# Patient Record
Sex: FEMALE | Race: ASIAN | Employment: OTHER | URBAN - METROPOLITAN AREA
[De-identification: names, ages, dates, MRNs, and addresses within clinical notes are randomized per-mention and may not be internally consistent; named-entity substitution may affect disease eponyms.]

---

## 2017-03-20 DIAGNOSIS — Z00.00 ENCOUNTER FOR ROUTINE ADULT HEALTH EXAMINATION WITHOUT ABNORMAL FINDINGS: ICD-10-CM

## 2017-03-20 PROCEDURE — 86480 TB TEST CELL IMMUN MEASURE: CPT | Performed by: INTERNAL MEDICINE

## 2017-03-20 PROCEDURE — 36415 COLL VENOUS BLD VENIPUNCTURE: CPT | Performed by: INTERNAL MEDICINE

## 2017-03-21 LAB
M TB TUBERC IFN-G BLD QL: ABNORMAL
M TB TUBERC IFN-G/MITOGEN IGNF BLD: 0.85 IU/ML

## 2017-03-23 ENCOUNTER — TELEPHONE (OUTPATIENT)
Dept: INTERNAL MEDICINE | Facility: CLINIC | Age: 47
End: 2017-03-23

## 2017-03-23 DIAGNOSIS — R76.12 POSITIVE QUANTIFERON-TB GOLD TEST: Primary | ICD-10-CM

## 2017-03-23 NOTE — TELEPHONE ENCOUNTER
Reason for Call:  Request for results:    Name of test or procedure: blood tests   Date of test of procedure: 11/2    Location of the test or procedure: [facility] to leave the result message on voice mail or with a family member? YES    Phone number Patient can be reached at:  Home number on file [phone] (home)    Additional comments: lab results from px    Call taken on 3/23/2017 at 8:24 AM by Ashley Yousif

## 2017-03-23 NOTE — TELEPHONE ENCOUNTER
See her results. She is low positive again. She is calling again and sent my chart message.     Please advise.

## 2017-03-24 ENCOUNTER — ALLIED HEALTH/NURSE VISIT (OUTPATIENT)
Dept: NURSING | Facility: CLINIC | Age: 47
End: 2017-03-24
Payer: COMMERCIAL

## 2017-03-24 DIAGNOSIS — Z11.1 SCREENING EXAMINATION FOR PULMONARY TUBERCULOSIS: Primary | ICD-10-CM

## 2017-03-24 PROCEDURE — 86580 TB INTRADERMAL TEST: CPT

## 2017-03-24 NOTE — TELEPHONE ENCOUNTER
Call to ID for earlier appt.     Appt at 4/4 at 10:15. Dr Rios. They are mailing her information.     Call to pt and advised. She agrees.     Faxed ID clinic results.

## 2017-03-24 NOTE — TELEPHONE ENCOUNTER
Test is low positive both times, pl advise to consult with Infectious disease for further recommendations, I have put the referral

## 2017-03-24 NOTE — TELEPHONE ENCOUNTER
[facility] [employer] - Kim [phone]       Pt informed of PCP's message below.  Referral info provided to her.

## 2017-03-24 NOTE — TELEPHONE ENCOUNTER
Pt calls again. Advised her that she needs to allow more time for Dr. Silvestre to review and provide recommendations for her.

## 2017-03-24 NOTE — TELEPHONE ENCOUNTER
Pt called-stated she called [facility] for appt, but they cant get her in til end of April. Pt stated she has to get back to work. Was told doctor or nurse from our clinic needs to call so pt can get in sooner. Can reach pt at [phone]

## 2017-03-24 NOTE — TELEPHONE ENCOUNTER
Pt is calling in again, looking for the results of her TB test.  She needs these results to return to work.  She states that she had the same results last time.    Please review lab and advise  Howard GLASER RN

## 2017-03-27 ENCOUNTER — RADIANT APPOINTMENT (OUTPATIENT)
Dept: GENERAL RADIOLOGY | Facility: CLINIC | Age: 47
End: 2017-03-27
Attending: INTERNAL MEDICINE
Payer: COMMERCIAL

## 2017-03-27 ENCOUNTER — ALLIED HEALTH/NURSE VISIT (OUTPATIENT)
Dept: NURSING | Facility: CLINIC | Age: 47
End: 2017-03-27
Payer: COMMERCIAL

## 2017-03-27 DIAGNOSIS — R76.12 POSITIVE QUANTIFERON-TB GOLD TEST: ICD-10-CM

## 2017-03-27 DIAGNOSIS — Z11.1 VISIT FOR MANTOUX TEST: Primary | ICD-10-CM

## 2017-03-27 LAB
PPDINDURATION: 0 MM (ref 0–5)
PPDREDNESS: 0 MM

## 2017-03-27 PROCEDURE — 99207 ZZC NO CHARGE NURSE ONLY: CPT

## 2017-03-27 PROCEDURE — 71020 XR CHEST 2 VW: CPT

## 2017-03-27 NOTE — NURSING NOTE
Mantoux result:  Lab Results   Component Value Date    PPDREDNESS 0 03/04/2016    PPDINDURATIO 0 03/04/2016     Angelica Lambert MA

## 2017-03-27 NOTE — MR AVS SNAPSHOT
After Visit Summary   3/27/2017    Alida Pérez    MRN: [id number]           Patient Information     Date Of Birth          1970        Visit Information        Provider Department      3/27/2017 8:30 AM DOROTHY COPELAND/LPN [facility]        Today's Diagnoses     Visit for Mantoux test    -  1       Follow-ups after your visit        Your next 10 appointments already scheduled     Mar 27, 2017  8:45 AM CDT   XR CHEST 2 VIEWS with RVXR1   [facility]    [address] [place] [address] [id number] [phone]           Please bring a list of your current medicines to your exam. (Include vitamins, minerals and over-thecounter medicines.) Leave your valuables at home.  Tell your doctor if there is a chance you may be pregnant.  You do not need to do anything special for this exam.              Who to contact     If you have questions or need follow up information about today's clinic visit or your schedule please contact [facility] directly at [phone].  Normal or non-critical lab and imaging results will be communicated to you by [employer], letter or phone within 4 business days after the clinic has received the results. If you do not hear from us within 7 days, please contact the clinic through [employer] or phone. If you have a critical or abnormal lab result, we will notify you by phone as soon as possible.  Submit refill requests through "[employer] or call your pharmacy and they will forward the refill request to us. Please allow 3 business days for your refill to be completed.          Additional Information About Your Visit        MyChart Information     "[employer] gives you secure access to your electronic health record. If you see a primary care provider, you can also send messages to your care team and make appointments. If you have questions, please call your primary care clinic.  If you do not have a primary care  provider, please call [phone] and they will assist you.        Care EveryWhere ID     This is your Care EveryWhere ID. This could be used by other organizations to access your [facility] medical records  [id number]         Blood Pressure from Last 3 Encounters:   11/02/16 112/72   01/13/16 102/74   10/02/15 102/72    Weight from Last 3 Encounters:   11/02/16 100 lb (45.4 kg)   01/13/16 104 lb (47.2 kg)   10/02/15 104 lb 7 oz (47.4 kg)              Today, you had the following     No orders found for display       Primary Care Provider Office Phone # Fax #    Deena HAUSER MD Konrad [phone]       [facility] [address] [facility] [address]        Thank you!     Thank you for choosing [facility]  for your care. Our goal is always to provide you with excellent care. Hearing back from our patients is one way we can continue to improve our services. Please take a few minutes to complete the written survey that you may receive in the mail after your visit with us. Thank you!             Your Updated Medication List - Protect others around you: Learn how to safely use, store and throw away your medicines at www.disposemymeds.org.          This list is accurate as of: 3/27/17  8:42 AM.  Always use your most recent med list.                   Brand Name Dispense Instructions for use    calcium + D 600-200 MG-UNIT Tabs   Generic drug:  calcium carbonate-vitamin D     200 Tab    500 mg tablet once daily       norethindrone-ethinyl estradiol 1-20 MG-MCG per tablet    LOESTRIN 1/20 (21    84 tablet    Take 1 tablet by mouth daily

## 2017-03-27 NOTE — PROGRESS NOTES
Mantoux result:  Lab Results   Component Value Date    PPDREDNESS 0 03/04/2016    PPDINDURATIO 0 03/04/2016

## 2017-05-31 ENCOUNTER — HOSPITAL ENCOUNTER (OUTPATIENT)
Dept: MAMMOGRAPHY | Facility: CLINIC | Age: 47
Discharge: HOME | End: 2017-05-31
Attending: INTERNAL MEDICINE | Admitting: INTERNAL MEDICINE
Payer: COMMERCIAL

## 2017-05-31 DIAGNOSIS — Z12.31 VISIT FOR SCREENING MAMMOGRAM: ICD-10-CM

## 2017-05-31 PROCEDURE — G0202 SCR MAMMO BI INCL CAD: HCPCS

## 2017-06-23 ENCOUNTER — MYC MEDICAL ADVICE (OUTPATIENT)
Dept: INTERNAL MEDICINE | Facility: CLINIC | Age: 47
End: 2017-06-23

## 2017-06-23 ENCOUNTER — TELEPHONE (OUTPATIENT)
Dept: INTERNAL MEDICINE | Facility: CLINIC | Age: 47
End: 2017-06-23

## 2017-06-23 NOTE — TELEPHONE ENCOUNTER
Name of person picking up: Alida Pérez     If not patient, relationship to patient: pt    Type of identification: LALO THIBODEAUX #: [id number]    What was picked up: Results

## 2017-06-23 NOTE — TELEPHONE ENCOUNTER
Pt called instead and this was discussed.   Immunization titers were done in 2011 and in 2007. Copy of results were put in the front for pt to . Answered all of pts questions and she had no further concerns or questions.

## 2017-10-10 ENCOUNTER — ALLIED HEALTH/NURSE VISIT (OUTPATIENT)
Dept: NURSING | Facility: CLINIC | Age: 47
End: 2017-10-10
Payer: COMMERCIAL

## 2017-10-10 DIAGNOSIS — Z23 NEED FOR PROPHYLACTIC VACCINATION AND INOCULATION AGAINST INFLUENZA: Primary | ICD-10-CM

## 2017-10-10 PROCEDURE — 90686 IIV4 VACC NO PRSV 0.5 ML IM: CPT

## 2017-10-10 PROCEDURE — 99207 ZZC NO CHARGE NURSE ONLY: CPT

## 2017-10-10 PROCEDURE — 90471 IMMUNIZATION ADMIN: CPT

## 2017-10-10 NOTE — MR AVS SNAPSHOT
After Visit Summary   10/10/2017    Alida Pérez    MRN: [id number]           Patient Information     Date Of Birth          1970        Visit Information        Provider Department      10/10/2017 3:15 PM EDITH ALDRICH MA/ANSON [facility]        Today's Diagnoses     Need for prophylactic vaccination and inoculation against influenza    -  1       Follow-ups after your visit        Your next 10 appointments already scheduled     Oct 10, 2017  3:15 PM CDT   Nurse Only with EDITH MCGRATH   [facility]    [address] [facility] [address] [phone]              Who to contact     If you have questions or need follow up information about today's clinic visit or your schedule please contact [facility] directly at [phone].  Normal or non-critical lab and imaging results will be communicated to you by [employer], letter or phone within 4 business days after the clinic has received the results. If you do not hear from us within 7 days, please contact the clinic through [employer] or phone. If you have a critical or abnormal lab result, we will notify you by phone as soon as possible.  Submit refill requests through [employer] or call your pharmacy and they will forward the refill request to us. Please allow 3 business days for your refill to be completed.          Additional Information About Your Visit        MyChart Information     [employer] gives you secure access to your electronic health record. If you see a primary care provider, you can also send messages to your care team and make appointments. If you have questions, please call your primary care clinic.  If you do not have a primary care provider, please call [phone] and they will assist you.        Care EveryWhere ID     This is your Care EveryWhere ID. This could be used by other organizations to access your [facility]  records  [id number]         Blood Pressure from Last 3 Encounters:   11/02/16 112/72   01/13/16 102/74   10/02/15 102/72    Weight from Last 3 Encounters:   11/02/16 100 lb (45.4 kg)   01/13/16 104 lb (47.2 kg)   10/02/15 104 lb 7 oz (47.4 kg)              We Performed the Following     FLU VAC, SPLIT VIRUS IM > 3 YO (QUADRIVALENT) [01467]     Vaccine Administration, Initial [35325]        Primary Care Provider Office Phone # Fax #    Deena HAUSER MD Kon[place] [phone]       [address] [facility] [address]        Equal Access to Services     JOHNIE OWENS : Hadii edin echevarriao Javad, waaxda luqadaha, qaybta kaalmada dread, felice mcmillan . So [facility] [phone].    ATENCIÓN: Si habla español, tiene a bangura disposición servicios gratuitos de asistencia lingüística. [facility] [phone].    We comply with applicable federal civil rights laws and [place] laws. We do not discriminate on the basis of race, color, national origin, age, disability, sex, sexual orientation, or gender identity.            Thank you!     Thank you for choosing [facility]  for your care. Our goal is always to provide you with excellent care. Hearing back from our patients is one way we can continue to improve our services. Please take a few minutes to complete the written survey that you may receive in the mail after your visit with us. Thank you!             Your Updated Medication List - Protect others around you: Learn how to safely use, store and throw away your medicines at www.disposemymeds.org.          This list is accurate as of: 10/10/17 11:00 AM.  Always use your most recent med list.                   Brand Name Dispense Instructions for use Diagnosis    calcium + D 600-200 MG-UNIT Tabs   Generic drug:  calcium carbonate-vitamin D     200 Tab    500 mg tablet once daily        norethindrone-ethinyl estradiol 1-20 MG-MCG per tablet    LOESTRIN 1/20  (21)    84 tablet    Take 1 tablet by mouth daily    Cysts of both ovaries

## 2017-10-10 NOTE — PROGRESS NOTES
Injectable Influenza Immunization Documentation    1.  Is the person to be vaccinated sick today?   No    2. Does the person to be vaccinated have an allergy to a component   of the vaccine?   No    3. Has the person to be vaccinated ever had a serious reaction   to influenza vaccine in the past?   No    4. Has the person to be vaccinated ever had Guillain-Barré syndrome?   No    Form completed by Xochitl

## 2017-10-31 ENCOUNTER — OFFICE VISIT (OUTPATIENT)
Dept: FAMILY MEDICINE | Facility: CLINIC | Age: 47
End: 2017-10-31
Payer: COMMERCIAL

## 2017-10-31 ENCOUNTER — TELEPHONE (OUTPATIENT)
Dept: FAMILY MEDICINE | Facility: CLINIC | Age: 47
End: 2017-10-31

## 2017-10-31 VITALS
OXYGEN SATURATION: 100 % | HEIGHT: 62 IN | DIASTOLIC BLOOD PRESSURE: 68 MMHG | HEART RATE: 96 BPM | BODY MASS INDEX: 18.03 KG/M2 | SYSTOLIC BLOOD PRESSURE: 102 MMHG | WEIGHT: 98 LBS | TEMPERATURE: 97.7 F

## 2017-10-31 DIAGNOSIS — S29.012A STRAIN OF MUSCLE AND TENDON OF BACK WALL OF THORAX, INITIAL ENCOUNTER: ICD-10-CM

## 2017-10-31 DIAGNOSIS — K13.70 ORAL MUCOSAL LESION: ICD-10-CM
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
## 2017-10-31 DIAGNOSIS — M54.2 CERVICALGIA: ICD-10-CM

## 2017-10-31 DIAGNOSIS — M79.10 MUSCLE SORENESS: ICD-10-CM

## 2017-10-31 PROCEDURE — 99214 OFFICE O/P EST MOD 30 MIN: CPT | Performed by: PHYSICIAN ASSISTANT
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
## 2017-11-02 ENCOUNTER — MYC REFILL (OUTPATIENT)
Dept: INTERNAL MEDICINE | Facility: CLINIC | Age: 47
End: 2017-11-02

## 2017-11-02 DIAGNOSIS — N83.202 CYSTS OF BOTH OVARIES: ICD-10-CM

## 2017-11-02 DIAGNOSIS — N83.201 CYSTS OF BOTH OVARIES: ICD-10-CM

## 2017-11-02 RX ORDER — NORETHINDRONE ACETATE AND ETHINYL ESTRADIOL .02; 1 MG/1; MG/1
1 TABLET ORAL DAILY
Qty: 84 TABLET | Refills: 3 | Status: CANCELLED | OUTPATIENT
Start: 2017-11-02

## 2017-11-03 ENCOUNTER — OFFICE VISIT (OUTPATIENT)
Dept: INTERNAL MEDICINE | Facility: CLINIC | Age: 47
End: 2017-11-03
Payer: COMMERCIAL

## 2017-11-03 VITALS
WEIGHT: 95.8 LBS | BODY MASS INDEX: 17.63 KG/M2 | TEMPERATURE: 98.1 F | SYSTOLIC BLOOD PRESSURE: 112 MMHG | DIASTOLIC BLOOD PRESSURE: 70 MMHG | HEIGHT: 62 IN | HEART RATE: 101 BPM | OXYGEN SATURATION: 97 %

## 2017-11-03 DIAGNOSIS — N83.202 CYSTS OF BOTH OVARIES: ICD-10-CM

## 2017-11-03 DIAGNOSIS — Z00.00 ENCOUNTER FOR ROUTINE ADULT HEALTH EXAMINATION WITHOUT ABNORMAL FINDINGS: Primary | ICD-10-CM

## 2017-11-03 DIAGNOSIS — R10.11 RUQ ABDOMINAL PAIN: ICD-10-CM

## 2017-11-03 DIAGNOSIS — Z11.1 SCREENING EXAMINATION FOR PULMONARY TUBERCULOSIS: ICD-10-CM

## 2017-11-03 DIAGNOSIS — N83.201 CYSTS OF BOTH OVARIES: ICD-10-CM

## 2017-11-03 LAB
ALBUMIN SERPL-MCNC: 3.8 G/DL (ref 3.4–5)
ALP SERPL-CCNC: 42 U/L (ref 40–150)
ALT SERPL W P-5'-P-CCNC: 16 U/L (ref 0–50)
ANION GAP SERPL CALCULATED.3IONS-SCNC: 8 MMOL/L (ref 3–14)
AST SERPL W P-5'-P-CCNC: 19 U/L (ref 0–45)
BILIRUB SERPL-MCNC: 0.7 MG/DL (ref 0.2–1.3)
BUN SERPL-MCNC: 10 MG/DL (ref 7–30)
CALCIUM SERPL-MCNC: 8.9 MG/DL (ref 8.5–10.1)
CHLORIDE SERPL-SCNC: 103 MMOL/L (ref 94–109)
CHOLEST SERPL-MCNC: 174 MG/DL
CO2 SERPL-SCNC: 24 MMOL/L (ref 20–32)
CREAT SERPL-MCNC: 0.71 MG/DL (ref 0.52–1.04)
DEPRECATED CALCIDIOL+CALCIFEROL SERPL-MC: 22 UG/L (ref 20–75)
GFR SERPL CREATININE-BSD FRML MDRD: 88 ML/MIN/1.7M2
GLUCOSE SERPL-MCNC: 75 MG/DL (ref 70–99)
HDLC SERPL-MCNC: 54 MG/DL
HGB BLD-MCNC: 13.6 G/DL (ref 11.7–15.7)
LDLC SERPL CALC-MCNC: 89 MG/DL
NONHDLC SERPL-MCNC: 120 MG/DL
POTASSIUM SERPL-SCNC: 3.5 MMOL/L (ref 3.4–5.3)
PROT SERPL-MCNC: 8.3 G/DL (ref 6.8–8.8)
SODIUM SERPL-SCNC: 135 MMOL/L (ref 133–144)
TRIGL SERPL-MCNC: 155 MG/DL
TSH SERPL DL<=0.005 MIU/L-ACNC: 2.45 MU/L (ref 0.4–4)

## 2017-11-03 PROCEDURE — 82306 VITAMIN D 25 HYDROXY: CPT | Performed by: INTERNAL MEDICINE

## 2017-11-03 PROCEDURE — 99396 PREV VISIT EST AGE 40-64: CPT | Performed by: INTERNAL MEDICINE

## 2017-11-03 PROCEDURE — 80061 LIPID PANEL: CPT | Performed by: INTERNAL MEDICINE

## 2017-11-03 PROCEDURE — 84443 ASSAY THYROID STIM HORMONE: CPT | Performed by: INTERNAL MEDICINE

## 2017-11-03 PROCEDURE — 85018 HEMOGLOBIN: CPT | Performed by: INTERNAL MEDICINE

## 2017-11-03 PROCEDURE — 80053 COMPREHEN METABOLIC PANEL: CPT | Performed by: INTERNAL MEDICINE

## 2017-11-03 PROCEDURE — 36415 COLL VENOUS BLD VENIPUNCTURE: CPT | Performed by: INTERNAL MEDICINE

## 2017-11-03 RX ORDER — NORETHINDRONE ACETATE AND ETHINYL ESTRADIOL .02; 1 MG/1; MG/1
1 TABLET ORAL DAILY
Qty: 84 TABLET | Refills: 4 | Status: SHIPPED | OUTPATIENT
Start: 2017-11-03 | End: 2018-11-03

## 2017-11-03 NOTE — NURSING NOTE
"Chief Complaint   Patient presents with     Physical     Fasting       Initial /70  Pulse 101  Temp 98.1  F (36.7  C) (Oral)  Ht 5' 2\" (1.575 m)  Wt 95 lb 12.8 oz (43.5 kg)  LMP 10/25/2017  SpO2 97%  BMI 17.52 kg/m2 Estimated body mass index is 17.52 kg/(m^2) as calculated from the following:    Height as of this encounter: 5' 2\" (1.575 m).    Weight as of this encounter: 95 lb 12.8 oz (43.5 kg).  Medication Reconciliation: complete     Alicia Almanza CMA      "

## 2017-11-03 NOTE — MR AVS SNAPSHOT
After Visit Summary   11/3/2017    Alida Pérez    MRN: [id number]           Patient Information     Date Of Birth          1970        Visit Information        Provider Department      11/3/2017 8:40 AM Deena Silvestre MD [facility]        Today's Diagnoses     Encounter for routine adult health examination without abnormal findings    -  1    Cysts of both ovaries        RUQ abdominal pain        Screening examination for pulmonary tuberculosis          Care Instructions      Preventive Health Recommendations  Female Ages 40 to 49    Yearly exam:     See your health care provider every year in order to  1. Review health changes.   2. Discuss preventive care.    3. Review your medicines if your doctor prescribed any.      Get a Pap test every three years (unless you have an abnormal result and your provider advises testing more often).      If you get Pap tests with HPV test, you only need to test every 5 years, unless you have an abnormal result. You do not need a Pap test if your uterus was removed (hysterectomy) and you have not had cancer.      You should be tested each year for STDs (sexually transmitted diseases), if you're at risk.       Ask your doctor if you should have a mammogram.      Have a colonoscopy (test for colon cancer) if someone in your family has had colon cancer or polyps before age 50.       Have a cholesterol test every 5 years.       Have a diabetes test (fasting glucose) after age 45. If you are at risk for diabetes, you should have this test every 3 years.    Shots: Get a flu shot each year. Get a tetanus shot every 10 years.     Nutrition:     Eat at least 5 servings of fruits and vegetables each day.    Eat whole-grain bread, whole-wheat pasta and brown rice instead of white grains and rice.    Talk to your provider about Calcium and Vitamin D.     Lifestyle    Exercise at least 150 minutes a week (an average of 30 minutes a day, 5 days a  "week). This will help you control your weight and prevent disease.    Limit alcohol to one drink per day.    No smoking.     Wear sunscreen to prevent skin cancer.    See your dentist every six months for an exam and cleaning.          Follow-ups after your visit        Future tests that were ordered for you today     Open Future Orders        Priority Expected Expires Ordered    TB INTRADERMAL TEST Routine  11/3/2018 11/3/2017    US Abdomen Complete Routine  11/3/2018 11/3/2017            Who to contact     If you have questions or need follow up information about today's clinic visit or your schedule please contact [facility] directly at [phone].  Normal or non-critical lab and imaging results will be communicated to you by MyChart, letter or phone within 4 business days after the clinic has received the results. If you do not hear from us within 7 days, please contact the clinic through [employer] or phone. If you have a critical or abnormal lab result, we will notify you by phone as soon as possible.  Submit refill requests through [employer] or call your pharmacy and they will forward the refill request to us. Please allow 3 business days for your refill to be completed.          Additional Information About Your Visit        [employer] Information     [employer] gives you secure access to your electronic health record. If you see a primary care provider, you can also send messages to your care team and make appointments. If you have questions, please call your primary care clinic.  If you do not have a primary care provider, please call [phone] and they will assist you.        Care EveryWhere ID     This is your Care EveryWhere ID. This could be used by other organizations to access your [place] medical records  [id number]        Your Vitals Were     Pulse Temperature Height Last Period Pulse Oximetry BMI (Body Mass Index)    101 98.1  F (36.7  C) (Oral) 5' 2\" (1.575 m) 10/25/2017 97% 17.52 " kg/m2       Blood Pressure from Last 3 Encounters:   11/03/17 112/70   10/31/17 102/68   11/02/16 112/72    Weight from Last 3 Encounters:   11/03/17 95 lb 12.8 oz (43.5 kg)   10/31/17 98 lb (44.5 kg)   11/02/16 100 lb (45.4 kg)              We Performed the Following     Comprehensive metabolic panel     Hemoglobin     Lipid panel reflex to direct LDL Fasting     TSH with free T4 reflex     Vitamin D Deficiency          Where to get your medicines      These medications were sent to [facility] [address] [facility] [address] [facility] [address] & [facility]  [address] [place] [address] [address], [facility] [place], [place] [address]    Hours:  24-hours Phone:  [phone]     norethindrone-ethinyl estradiol 1-20 MG-MCG per tablet          Primary Care Provider Office Phone # Fax #    Jasoni MURTAZA Silvestre [phone]       [address] [facility] [address]        Equal Access to Services     [facility] AH: Hadii aad ku hadasho Soomaali, waaxda luqadaha, qaybta kaalmada adeegyada, felice montenegro haysourav mcmillan . So [facility] [phone].    ATENCIÓN: Si habla español, tiene a bangura disposición servicios gratuitos de asistencia lingüística. Llame al [phone].    We comply with applicable federal civil rights laws and [place] laws. We do not discriminate on the basis of race, color, national origin, age, disability, sex, sexual orientation, or gender identity.            Thank you!     Thank you for choosing [facility]  for your care. Our goal is always to provide you with excellent care. Hearing back from our patients is one way we can continue to improve our services. Please take a few minutes to complete the written survey that you may receive in the mail after your visit with us. Thank you!             Your Updated Medication List - Protect others around you: Learn how to safely use, store and throw away your medicines at www.disposemymeds.org.          This list is  accurate as of: 11/3/17  9:04 AM.  Always use your most recent med list.                   Brand Name Dispense Instructions for use Diagnosis    calcium + D 600-200 MG-UNIT Tabs   Generic drug:  calcium carbonate-vitamin D     200 Tab    500 mg tablet once daily        norethindrone-ethinyl estradiol 1-20 MG-MCG per tablet    LOESTRIN 1/20 (21)    84 tablet    Take 1 tablet by mouth daily    Cysts of both ovaries

## 2017-11-03 NOTE — PROGRESS NOTES
SUBJECTIVE:   CC: Alida Pérez is an 47 year old woman who presents for preventive health visit.     Physical   Annual:     Getting at least 3 servings of Calcium per day::  Yes    Bi-annual eye exam::  Yes    Dental care twice a year::  Yes    Sleep apnea or symptoms of sleep apnea::  None    Diet::  Regular (no restrictions)    Frequency of exercise::  None    Taking medications regularly::  Yes    Medication side effects::  Not applicable    Additional concerns today::  YES    Answers for HPI/ROS submitted by the patient on 11/2/2017   PHQ-2 Score: 0    Pt c/o RUQ pain radiating to back on and of since 2 mths, pain more after eating greasy foods. No n/v, BMs normal  Pt is requesting Vit D,  Thyroid check and refills on [facility]  Wants mantoux in 03/18         Today's PHQ-2 Score: PHQ-2 ( 1999 Pfizer) 11/3/2017   Q1: Little interest or pleasure in doing things 0   Q2: Feeling down, depressed or hopeless 0   PHQ-2 Score 0   Q1: Little interest or pleasure in doing things -   Q2: Feeling down, depressed or hopeless -   PHQ-2 Score -       Abuse: Current or Past(Physical, Sexual or Emotional)- No  Do you feel safe in your environment - Yes      Past Medical History:   Diagnosis Date     NO ACTIVE PROBLEMS        Past Surgical History:   Procedure Laterality Date     AS ENLARGE BREAST WITH IMPLANT  2014     EXCISE GANGLION WRIST  2009    right hand     MOUTH SURGERY      4 wisdom teeth/plus 4 teeth       Current Outpatient Prescriptions   Medication Sig Dispense Refill     norethindrone-ethinyl estradiol (LOESTRIN 1/20, 21,) 1-20 MG-MCG per tablet Take 1 tablet by mouth daily 84 tablet 4     CALCIUM + D 600-200 MG-UNIT PO TABS 500 mg tablet once daily 200 Tab 3     [DISCONTINUED] norethindrone-ethinyl estradiol (LOESTRIN 1/20, 21,) 1-20 MG-MCG per tablet Take 1 tablet by mouth daily 84 tablet 3       Family History   Problem Relation Age of Onset     Allergies Mother      and Asthma     Respiratory Mother       "Lipids Father      CANCER Maternal Grandmother      cervical     CEREBROVASCULAR DISEASE Paternal Grandfather        Social History   Substance Use Topics     Smoking status: Never Smoker     Smokeless tobacco: Never Used     Alcohol use No     The patient does not drink >3 drinks per day nor >7 drinks per week.    Reviewed orders with patient.  Reviewed health maintenance and updated orders accordingly - Yes      Pertinent mammograms are reviewed under the imaging tab.  History of abnormal Pap smear: NO - age 30- 65 PAP every 3 years recommended    Reviewed and updated as needed this visit by clinical staff  Tobacco  Allergies  Meds  Med Hx  Surg Hx  Fam Hx  Soc Hx        Reviewed and updated as needed this visit by Provider            Review of Systems  C: NEGATIVE for fever, chills, change in weight  I: NEGATIVE for worrisome rashes, moles or lesions  E: NEGATIVE for vision changes or irritation  ENT: NEGATIVE for ear, mouth and throat problems  R: NEGATIVE for significant cough or SOB  B: NEGATIVE for masses, tenderness or discharge  CV: NEGATIVE for chest pain, palpitations or peripheral edema  GI: NEGATIVE for nausea, abdominal pain, heartburn, or change in bowel habits  : NEGATIVE for unusual urinary or vaginal symptoms. Periods are regular.  M: NEGATIVE for significant arthralgias or myalgia  N: NEGATIVE for weakness, dizziness or paresthesias  P: NEGATIVE for changes in mood or affect     OBJECTIVE:   /70  Pulse 101  Temp 98.1  F (36.7  C) (Oral)  Ht 5' 2\" (1.575 m)  Wt 95 lb 12.8 oz (43.5 kg)  LMP 10/25/2017  SpO2 97%  BMI 17.52 kg/m2  Physical Exam  GENERAL: healthy, alert and no distress  EYES: Eyes grossly normal to inspection, PERRL and conjunctivae and sclerae normal  HENT: ear canals and TM's normal, nose and mouth without ulcers or lesions  NECK: no adenopathy, no asymmetry, masses, or scars and thyroid normal to palpation  RESP: lungs clear to auscultation - no rales, rhonchi " "or wheezes  BREAST: implants noted  without masses, tenderness or nipple discharge and no palpable axillary masses or adenopathy  CV: regular rate and rhythm, normal S1 S2, no S3 or S4, no murmur, click or rub, no peripheral edema and peripheral pulses strong  ABDOMEN: soft, no tenderness at this time  no hepatosplenomegaly, no masses and bowel sounds normal  MS: no gross musculoskeletal defects noted, no edema  NEURO: Normal strength and tone, mentation intact and speech normal  PSYCH: mentation appears normal, affect normal/bright    ASSESSMENT/PLAN:       (Z00.00) Encounter for routine adult health examination without abnormal findings  (primary encounter diagnosis)  Plan: Hemoglobin, Comprehensive metabolic panel,         Lipid panel reflex to direct LDL Fasting, TSH         with free T4 reflex, Vitamin D Deficiency            (N83.201,  N83.202) Cysts of both ovaries  Plan: norethindrone-ethinyl estradiol (LOESTRIN 1/20, 21,) 1-20 MG-MCG per tablet refilled.explained clearly about the medication,insructions and side effects.             (R10.11) RUQ abdominal pain  Plan:  R/o gallbladder etiology, check US Abdomen Complete.pt was told I will contact her after results and proceed accordingly.             COUNSELING:  Reviewed preventive health counseling, as reflected in patient instructions       Regular exercise       Healthy diet/nutrition         reports that she has never smoked. She has never used smokeless tobacco.    Estimated body mass index is 17.52 kg/(m^2) as calculated from the following:    Height as of this encounter: 5' 2\" (1.575 m).    Weight as of this encounter: 95 lb 12.8 oz (43.5 kg).         Counseling Resources:  ATP IV Guidelines  Pooled Cohorts Equation Calculator  Breast Cancer Risk Calculator  FRAX Risk Assessment  ICSI Preventive Guidelines  Dietary Guidelines for Americans, 2010  USDA's MyPlate  ASA Prophylaxis  Lung CA Screening    Deena Silvestre MD  [facility] [place]

## 2017-11-07 NOTE — TELEPHONE ENCOUNTER
Message from MyChart:  Original authorizing provider: Deena Silvestre MD    Ace Pérez would like a refill of the following medications:  norethindrone-ethinyl estradiol (LOESTRIN 1/20, 21,) 1-20 MG-MCG per tablet [Deena Silvestre MD]    Preferred pharmacy: [facility] [address] AT [place] [address] & [facility]    Comment:  need refill.

## 2017-11-08 ENCOUNTER — HOSPITAL ENCOUNTER (OUTPATIENT)
Dept: ULTRASOUND IMAGING | Facility: CLINIC | Age: 47
Discharge: HOME | End: 2017-11-08
Attending: INTERNAL MEDICINE | Admitting: INTERNAL MEDICINE
Payer: COMMERCIAL

## 2017-11-08 DIAGNOSIS — R10.11 RUQ ABDOMINAL PAIN: ICD-10-CM

## 2017-11-08 PROCEDURE — 76700 US EXAM ABDOM COMPLETE: CPT

## 2018-01-30 ENCOUNTER — OFFICE VISIT (OUTPATIENT)
Dept: INTERNAL MEDICINE | Facility: CLINIC | Age: 48
End: 2018-01-30
Payer: COMMERCIAL

## 2018-01-30 VITALS
SYSTOLIC BLOOD PRESSURE: 104 MMHG | BODY MASS INDEX: 17.87 KG/M2 | TEMPERATURE: 98.1 F | OXYGEN SATURATION: 100 % | DIASTOLIC BLOOD PRESSURE: 62 MMHG | WEIGHT: 97.7 LBS | HEART RATE: 104 BPM

## 2018-01-30 DIAGNOSIS — M77.12 LATERAL EPICONDYLITIS OF LEFT ELBOW: Primary | ICD-10-CM

## 2018-01-30 PROCEDURE — 99214 OFFICE O/P EST MOD 30 MIN: CPT | Performed by: INTERNAL MEDICINE

## 2018-01-30 NOTE — PATIENT INSTRUCTIONS
Lateral Epicondylitis (Tennis Elbow)   Rehabilitation Exercises   You may do the stretching exercises right away. You may do the strengthening exercises when stretching is nearly painless.   Stretching exercises     Wrist range of motion: Bend your wrist forward and backward as far as you can. Do 3 sets of 10.     Pronation and supination of the forearm: With your elbow bent 90 , turn your palm upward and hold for 5 seconds. Slowly turn your palm downward and hold for 5 seconds. Make sure you keep your elbow at your side and bent 90  throughout this exercise. Do 3 sets of 10.     Elbow range of motion: Gently bring your palm up toward your shoulder and bend your elbow as far as you can. Then straighten your elbow as far as you can 10 times. Do 3 sets of 10.   Strengthening exercises     Wrist flexion exercise: Hold a can or hammer handle in your hand with your palm facing up. Bend your wrist upward. Slowly lower the weight and return to the starting position. Do 3 sets of 10. Gradually increase the weight of the can or weight you are holding.     Wrist extension exercise: Hold a soup can or hammer handle in your hand with your palm facing down. Slowly bend your wrist upward. Slowly lower the weight down into the starting position. Do 3 sets of 10. Gradually increase the weight of the object you are holding.     Wrist radial deviation strengthening: Put your wrist in the sideways position with your thumb up. Hold a can of soup or a hammer handle and gently bend your wrist up, with the thumb reaching toward the ceiling. Slowly lower to the starting position. Do not move your forearm throughout this exercise. Do 3 sets of 10.     Forearm pronation and supination strengthening: Hold a soup can or hammer handle in your hand and bend your elbow 90 . Slowly rotate your hand with your palm upward and then palm down. Do 3 sets of 10.     Wrist extension (with broom handle): Stand up and hold a broom handle in  both hands. With your arms at shoulder level, elbows straight and palms down, roll the broom handle backward in your hand as if you are reeling something in using a broom handle. Do 3 sets of 10.   Written by Katherin Castillo MS, PT, for [employer].   Published by [employer].   This content is reviewed periodically and is subject to change as new health information becomes available. The information is intended to inform and educate and is not a replacement for medical evaluation, advice, diagnosis or treatment by a healthcare professional.   Sports Medicine Advisor 2003.1 Index  Sports Medicine Advisor 2003.1 Credits   Copyright   2003 [employer]. All rights reserved.

## 2018-01-30 NOTE — MR AVS SNAPSHOT
After Visit Summary   1/30/2018    Alida Pérez    MRN: [id number]           Patient Information     Date Of Birth          1970        Visit Information        Provider Department      1/30/2018 1:40 PM Deena Silvestre MD [facility]        Today's Diagnoses     Lateral epicondylitis of left elbow    -  1      Care Instructions                 Lateral Epicondylitis (Tennis Elbow)   Rehabilitation Exercises   You may do the stretching exercises right away. You may do the strengthening exercises when stretching is nearly painless.   Stretching exercises     Wrist range of motion: Bend your wrist forward and backward as far as you can. Do 3 sets of 10.     Pronation and supination of the forearm: With your elbow bent 90 , turn your palm upward and hold for 5 seconds. Slowly turn your palm downward and hold for 5 seconds. Make sure you keep your elbow at your side and bent 90  throughout this exercise. Do 3 sets of 10.     Elbow range of motion: Gently bring your palm up toward your shoulder and bend your elbow as far as you can. Then straighten your elbow as far as you can 10 times. Do 3 sets of 10.   Strengthening exercises     Wrist flexion exercise: Hold a can or hammer handle in your hand with your palm facing up. Bend your wrist upward. Slowly lower the weight and return to the starting position. Do 3 sets of 10. Gradually increase the weight of the can or weight you are holding.     Wrist extension exercise: Hold a soup can or hammer handle in your hand with your palm facing down. Slowly bend your wrist upward. Slowly lower the weight down into the starting position. Do 3 sets of 10. Gradually increase the weight of the object you are holding.     Wrist radial deviation strengthening: Put your wrist in the sideways position with your thumb up. Hold a can of soup or a hammer handle and gently bend your wrist up, with the thumb reaching toward the ceiling. Slowly lower  to the starting position. Do not move your forearm throughout this exercise. Do 3 sets of 10.     Forearm pronation and supination strengthening: Hold a soup can or hammer handle in your hand and bend your elbow 90 . Slowly rotate your hand with your palm upward and then palm down. Do 3 sets of 10.     Wrist extension (with broom handle): Stand up and hold a broom handle in both hands. With your arms at shoulder level, elbows straight and palms down, roll the broom handle backward in your hand as if you are reeling something in using a broom handle. Do 3 sets of 10.   Written by Katherin Castillo, MS, PT, for [employer].   Published by [employer].   This content is reviewed periodically and is subject to change as new health information becomes available. The information is intended to inform and educate and is not a replacement for medical evaluation, advice, diagnosis or treatment by a healthcare professional.   Sports Medicine Advisor 2003.1 Index  Sports Medicine Advisor 2003.1 Credits   Copyright   2003 [employer]. All rights reserved.                    Follow-ups after your visit        Who to contact     If you have questions or need follow up information about today's clinic visit or your schedule please contact [facility] directly at [phone].  Normal or non-critical lab and imaging results will be communicated to you by MyChart, letter or phone within 4 business days after the clinic has received the results. If you do not hear from us within 7 days, please contact the clinic through [employer] or phone. If you have a critical or abnormal lab result, we will notify you by phone as soon as possible.  Submit refill requests through [employer] or call your pharmacy and they will forward the refill request to us. Please allow 3 business days for your refill to be completed.          Additional Information About Your Visit        [employer]  Information     [employer] gives you secure access to your electronic health record. If you see a primary care provider, you can also send messages to your care team and make appointments. If you have questions, please call your primary care clinic.  If you do not have a primary care provider, please call [phone] and they will assist you.        Care EveryWhere ID     This is your Care EveryWhere ID. This could be used by other organizations to access your [place] medical records  [id number]        Your Vitals Were     Pulse Temperature Last Period Pulse Oximetry BMI (Body Mass Index)       104 98.1  F (36.7  C) (Oral) 01/16/2018 100% 17.87 kg/m2        Blood Pressure from Last 3 Encounters:   01/30/18 104/62   11/03/17 112/70   10/31/17 102/68    Weight from Last 3 Encounters:   01/30/18 97 lb 11.2 oz (44.3 kg)   11/03/17 95 lb 12.8 oz (43.5 kg)   10/31/17 98 lb (44.5 kg)              Today, you had the following     No orders found for display       Primary Care Provider Office Phone # Fax #    Krishnakumari G MD Konrad [phone]       [address] [facility] [address]        Equal Access to Services     JOHNIE OWENS : Hadii aad ku hadasho Soomaali, waaxda luqadaha, qaybta kaalmada adeegyada, waxay idiin hayrenettan gael khwendi la'sourav ah. So [facility] [place] [phone].    ATENCIÓN: Si habla español, tiene a bangura disposición servicios gratuitos de asistencia lingüística. Llame al [phone].    We comply with applicable federal civil rights laws and [place] laws. We do not discriminate on the basis of race, color, national origin, age, disability, sex, sexual orientation, or gender identity.            Thank you!     Thank you for choosing [facility]  for your care. Our goal is always to provide you with excellent care. Hearing back from our patients is one way we can continue to improve our services. Please take a few minutes to complete the written survey that you may receive  in the mail after your visit with us. Thank you!             Your Updated Medication List - Protect others around you: Learn how to safely use, store and throw away your medicines at www.disposemymeds.org.          This list is accurate as of 1/30/18  2:03 PM.  Always use your most recent med list.                   Brand Name Dispense Instructions for use Diagnosis    calcium + D 600-200 MG-UNIT Tabs   Generic drug:  calcium carbonate-vitamin D     200 Tab    500 mg tablet once daily        norethindrone-ethinyl estradiol 1-20 MG-MCG per tablet    LOESTRIN 1/20 (21)    84 tablet    Take 1 tablet by mouth daily    Cysts of both ovaries
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
## 2018-03-27 ENCOUNTER — ALLIED HEALTH/NURSE VISIT (OUTPATIENT)
Dept: NURSING | Facility: CLINIC | Age: 48
End: 2018-03-27
Payer: COMMERCIAL

## 2018-03-27 DIAGNOSIS — Z11.1 SCREENING EXAMINATION FOR PULMONARY TUBERCULOSIS: ICD-10-CM

## 2018-03-27 PROCEDURE — 86580 TB INTRADERMAL TEST: CPT

## 2018-03-27 NOTE — PROGRESS NOTES
The patient is asked the following questions today and these are her answers:    -Have you had a mantoux administered in the past 30 days?    No  -Have you had a previous positive Mantoux.  No  -Have you received BCG in the past.  No  -Have you had a live vaccine  (MMR, Varicella, OPV, Yellow Fever) in the last 6 weeks.  No  -Have you had and active  viral or bacterial infection in the past 6 weeks.  No  -Have you received corticosteroids or immunosuppressive agents in the past 6 weeks.  No  -Have you been diagnosed with HIV?  No  -Do you have a maglinancy?  No  Mandyjin Dao MA

## 2018-03-27 NOTE — LETTER
April 5, 2018      Alida Pérez  [address] [place] [id number]        Dear ,    We are writing to inform you of your test results.    Negative TB test.    Resulted Orders   TB INTRADERMAL TEST   Result Value Ref Range    PPD Induration 0 0 - 5 mm    PPD Redness 0 mm    Narrative    Mantoux results: No induration.  No swelling.  No redness.  Alma Enamorado RN, BSN  [facility]         If you have any questions or concerns, please call the clinic at the number listed above.       Sincerely,    Dr Silvestre

## 2018-03-27 NOTE — MR AVS SNAPSHOT
After Visit Summary   3/27/2018    Alida Pérez    MRN: [id number]           Patient Information     Date Of Birth          1970        Visit Information        Provider Department      3/27/2018 9:30 AM DOROTHY MA/LPN [facility]        Today's Diagnoses     Screening examination for pulmonary tuberculosis           Follow-ups after your visit        Your next 10 appointments already scheduled     Mar 29, 2018  2:00 PM CDT   Nurse Only with [facility]   [facility]    [address] [place] [address] [phone]              Who to contact     If you have questions or need follow up information about today's clinic visit or your schedule please contact [facility] directly at [phone].  Normal or non-critical lab and imaging results will be communicated to you by MyChart, letter or phone within 4 business days after the clinic has received the results. If you do not hear from us within 7 days, please contact the clinic through MyChart or phone. If you have a critical or abnormal lab result, we will notify you by phone as soon as possible.  Submit refill requests through [employer] or call your pharmacy and they will forward the refill request to us. Please allow 3 business days for your refill to be completed.          Additional Information About Your Visit        MyChart Information     [employer] gives you secure access to your electronic health record. If you see a primary care provider, you can also send messages to your care team and make appointments. If you have questions, please call your primary care clinic.  If you do not have a primary care provider, please call [phone] and they will assist you.        Care EveryWhere ID     This is your Care EveryWhere ID. This could be used by other organizations to access your [place] medical records  [id number]         Blood Pressure from Last 3 Encounters:    01/30/18 104/62   11/03/17 112/70   10/31/17 102/68    Weight from Last 3 Encounters:   01/30/18 97 lb 11.2 oz (44.3 kg)   11/03/17 95 lb 12.8 oz (43.5 kg)   10/31/17 98 lb (44.5 kg)              We Performed the Following     TB INTRADERMAL TEST        Primary Care Provider Office Phone # Fax #    Deena HAUSER MD [facility] [phone]       [address] [facility] [address]        Equal Access to Services     [facility]: Hadii aad ku hadasho Soomaali, waaxda luqadaha, qaybta kaalmada adecanyalinda, felice mcmillan . So [facility] [phone].    ATENCIÓN: Si habla español, tiene a bangura disposición servicios gratuitos de asistencia lingüística. [facility] [phone].    We comply with applicable federal civil rights laws and [place] laws. We do not discriminate on the basis of race, color, national origin, age, disability, sex, sexual orientation, or gender identity.            Thank you!     Thank you for choosing [facility]  for your care. Our goal is always to provide you with excellent care. Hearing back from our patients is one way we can continue to improve our services. Please take a few minutes to complete the written survey that you may receive in the mail after your visit with us. Thank you!             Your Updated Medication List - Protect others around you: Learn how to safely use, store and throw away your medicines at www.disposemymeds.org.          This list is accurate as of 3/27/18  9:33 AM.  Always use your most recent med list.                   Brand Name Dispense Instructions for use Diagnosis    calcium + D 600-200 MG-UNIT Tabs   Generic drug:  calcium carbonate-vitamin D     200 Tab    500 mg tablet once daily        norethindrone-ethinyl estradiol 1-20 MG-MCG per tablet    LOESTRIN 1/20 (21)    84 tablet    Take 1 tablet by mouth daily    Cysts of both ovaries

## 2018-03-29 ENCOUNTER — ALLIED HEALTH/NURSE VISIT (OUTPATIENT)
Dept: NURSING | Facility: CLINIC | Age: 48
End: 2018-03-29
Payer: COMMERCIAL

## 2018-03-29 DIAGNOSIS — Z11.1 SCREENING EXAMINATION FOR PULMONARY TUBERCULOSIS: Primary | ICD-10-CM

## 2018-03-29 LAB
PPDINDURATION: 0 MM (ref 0–5)
PPDREDNESS: 0 MM

## 2018-03-29 PROCEDURE — 99207 ZZC NO CHARGE NURSE ONLY: CPT

## 2018-03-30 ENCOUNTER — MYC MEDICAL ADVICE (OUTPATIENT)
Dept: INTERNAL MEDICINE | Facility: CLINIC | Age: 48
End: 2018-03-30

## 2018-03-30 DIAGNOSIS — Z11.59 NEED FOR HEPATITIS B SCREENING TEST: Primary | ICD-10-CM

## 2018-04-02 NOTE — TELEPHONE ENCOUNTER
Patient asking about her Hep B vaccine status. Immunization hx shows patient had one dose 8/27/07. If this is in fact the case, does she need to start over or only need 2 more?

## 2018-04-02 NOTE — TELEPHONE ENCOUNTER
Please advise patient to get hepatitis B titer. If it's negative, then we'll have to restart the series. Lab ordered in Epic. Please advise patient to make a lab only appointment

## 2018-04-03 DIAGNOSIS — Z11.59 NEED FOR HEPATITIS B SCREENING TEST: ICD-10-CM

## 2018-04-03 PROCEDURE — 36415 COLL VENOUS BLD VENIPUNCTURE: CPT | Performed by: INTERNAL MEDICINE

## 2018-04-03 PROCEDURE — 86706 HEP B SURFACE ANTIBODY: CPT | Performed by: INTERNAL MEDICINE

## 2018-04-05 LAB — HBV SURFACE AB SERPL IA-ACNC: 0 M[IU]/ML

## 2018-04-20 ENCOUNTER — ALLIED HEALTH/NURSE VISIT (OUTPATIENT)
Dept: NURSING | Facility: CLINIC | Age: 48
End: 2018-04-20
Payer: COMMERCIAL

## 2018-04-20 DIAGNOSIS — Z23 NEED FOR HEPATITIS B VACCINATION: Primary | ICD-10-CM

## 2018-04-20 PROCEDURE — 90746 HEPB VACCINE 3 DOSE ADULT IM: CPT

## 2018-04-20 PROCEDURE — 90471 IMMUNIZATION ADMIN: CPT

## 2018-04-20 NOTE — MR AVS SNAPSHOT
After Visit Summary   4/20/2018    Alida Pérez    MRN: [id number]           Patient Information     Date Of Birth          1970        Visit Information        Provider Department      4/20/2018 9:30 AM JOVANY NURSE AB [facility]        Today's Diagnoses     Need for hepatitis B vaccination    -  1       Follow-ups after your visit        Who to contact     If you have questions or need follow up information about today's clinic visit or your schedule please contact [facility] directly at [phone].  Normal or non-critical lab and imaging results will be communicated to you by [employer], letter or phone within 4 business days after the clinic has received the results. If you do not hear from us within 7 days, please contact the clinic through [employer] or phone. If you have a critical or abnormal lab result, we will notify you by phone as soon as possible.  Submit refill requests through [employer] or call your pharmacy and they will forward the refill request to us. Please allow 3 business days for your refill to be completed.          Additional Information About Your Visit        MyChart Information     [employer] gives you secure access to your electronic health record. If you see a primary care provider, you can also send messages to your care team and make appointments. If you have questions, please call your primary care clinic.  If you do not have a primary care provider, please call [phone] and they will assist you.        Care EveryWhere ID     This is your Care EveryWhere ID. This could be used by other organizations to access your [place] medical records  [id number]         Blood Pressure from Last 3 Encounters:   01/30/18 104/62   11/03/17 112/70   10/31/17 102/68    Weight from Last 3 Encounters:   01/30/18 97 lb 11.2 oz (44.3 kg)   11/03/17 95 lb 12.8 oz (43.5 kg)   10/31/17 98 lb (44.5 kg)              We Performed the Following     HEPATITIS B VACCINE, ADULT,          Primary Care Provider Office Phone # Fax #    Krishnakumari MURTAZA MD [facility] [phone]       [address] [facility] [address]        Equal Access to Services     IRONZARINA GRACIELA : Kavitha edin pro waleo Sosusanali, waaxda luqadaha, qaybta kaalmada adeegyada, felice branham laCailinsourav demarco. So [place] -[phone].    ATENCIÓN: Si habla español, tiene a bangura disposición servicios gratuitos de asistencia lingüística. Llame al [phone].    We comply with applicable federal civil rights laws and [place] laws. We do not discriminate on the basis of race, color, national origin, age, disability, sex, sexual orientation, or gender identity.            Thank you!     Thank you for choosing [facility]  for your care. Our goal is always to provide you with excellent care. Hearing back from our patients is one way we can continue to improve our services. Please take a few minutes to complete the written survey that you may receive in the mail after your visit with us. Thank you!             Your Updated Medication List - Protect others around you: Learn how to safely use, store and throw away your medicines at www.disposemymeds.org.          This list is accurate as of 4/20/18  9:51 AM.  Always use your most recent med list.                   Brand Name Dispense Instructions for use Diagnosis    calcium + D 600-200 MG-UNIT Tabs   Generic drug:  calcium carbonate-vitamin D     200 Tab    500 mg tablet once daily        norethindrone-ethinyl estradiol 1-20 MG-MCG per tablet    LOESTRIN 1/20 (21)    84 tablet    Take 1 tablet by mouth daily    Cysts of both ovaries

## 2018-04-20 NOTE — PROGRESS NOTES
Screening Questionnaire for Adult Immunization    Are you sick today?   No   Do you have allergies to medications, food, a vaccine component or latex?   No   Have you ever had a serious reaction after receiving a vaccination?   No   Do you have a long-term health problem with heart disease, lung disease, asthma, kidney disease, metabolic disease (e.g. diabetes), anemia, or other blood disorder?   No   Do you have cancer, leukemia, HIV/AIDS, or any other immune system problem?   No   In the past 3 months, have you taken medications that affect  your immune system, such as prednisone, other steroids, or anticancer drugs; drugs for the treatment of rheumatoid arthritis, Crohn s disease, or psoriasis; or have you had radiation treatments?   No   Have you had a seizure, or a brain or other nervous system problem?   No   During the past year, have you received a transfusion of blood or blood     products, or been given immune (gamma) globulin or antiviral drug?   No   For women: Are you pregnant or is there a chance you could become        pregnant during the next month?   No   Have you received any vaccinations in the past 4 weeks?   No     Immunization questionnaire answers were all negative.        Per orders of Dr. galeano, injection of Hep B given by Zina Charles. Patient instructed to remain in clinic for 15 minutes afterwards, and to report any adverse reaction to me immediately.       Screening performed by Zina Charles on 4/20/2018 at 9:41 AM.

## 2018-06-05 ENCOUNTER — HOSPITAL ENCOUNTER (OUTPATIENT)
Dept: MAMMOGRAPHY | Facility: CLINIC | Age: 48
Discharge: HOME | End: 2018-06-05
Attending: INTERNAL MEDICINE | Admitting: INTERNAL MEDICINE
Payer: COMMERCIAL

## 2018-06-05 DIAGNOSIS — Z12.31 VISIT FOR SCREENING MAMMOGRAM: ICD-10-CM

## 2018-06-05 PROCEDURE — 77063 BREAST TOMOSYNTHESIS BI: CPT

## 2018-10-01 ENCOUNTER — ALLIED HEALTH/NURSE VISIT (OUTPATIENT)
Dept: NURSING | Facility: CLINIC | Age: 48
End: 2018-10-01
Payer: COMMERCIAL

## 2018-10-01 DIAGNOSIS — Z23 NEED FOR PROPHYLACTIC VACCINATION AND INOCULATION AGAINST INFLUENZA: Primary | ICD-10-CM

## 2018-10-01 PROCEDURE — 90471 IMMUNIZATION ADMIN: CPT

## 2018-10-01 PROCEDURE — 90686 IIV4 VACC NO PRSV 0.5 ML IM: CPT

## 2018-10-01 NOTE — PROGRESS NOTES

## 2018-12-30 ASSESSMENT — ENCOUNTER SYMPTOMS
NERVOUS/ANXIOUS: 0
COUGH: 0
BREAST MASS: 0
CONSTIPATION: 0
PARESTHESIAS: 0
EYE PAIN: 0
HEARTBURN: 0
HEMATOCHEZIA: 0
MYALGIAS: 0
WEAKNESS: 0
ABDOMINAL PAIN: 0
SORE THROAT: 0
DIZZINESS: 0
PALPITATIONS: 0
DYSURIA: 0
HEMATURIA: 0
HEADACHES: 0
FEVER: 0
SHORTNESS OF BREATH: 0
NAUSEA: 0
FREQUENCY: 0
DIARRHEA: 0
JOINT SWELLING: 0
ARTHRALGIAS: 0
CHILLS: 0

## 2019-01-02 ENCOUNTER — OFFICE VISIT (OUTPATIENT)
Dept: INTERNAL MEDICINE | Facility: CLINIC | Age: 49
End: 2019-01-02
Payer: COMMERCIAL

## 2019-01-02 VITALS
TEMPERATURE: 97.4 F | OXYGEN SATURATION: 98 % | HEART RATE: 104 BPM | HEIGHT: 62 IN | BODY MASS INDEX: 18.61 KG/M2 | SYSTOLIC BLOOD PRESSURE: 102 MMHG | DIASTOLIC BLOOD PRESSURE: 68 MMHG | WEIGHT: 101.1 LBS | RESPIRATION RATE: 17 BRPM

## 2019-01-02 DIAGNOSIS — N89.8 VAGINAL DISCHARGE: ICD-10-CM

## 2019-01-02 DIAGNOSIS — N83.201 CYSTS OF BOTH OVARIES: ICD-10-CM

## 2019-01-02 DIAGNOSIS — N83.202 CYSTS OF BOTH OVARIES: ICD-10-CM

## 2019-01-02 DIAGNOSIS — Z00.00 ROUTINE HISTORY AND PHYSICAL EXAMINATION OF ADULT: Primary | ICD-10-CM

## 2019-01-02 LAB
ALBUMIN SERPL-MCNC: 3.6 G/DL (ref 3.4–5)
ALP SERPL-CCNC: 33 U/L (ref 40–150)
ALT SERPL W P-5'-P-CCNC: 17 U/L (ref 0–50)
ANION GAP SERPL CALCULATED.3IONS-SCNC: 8 MMOL/L (ref 3–14)
AST SERPL W P-5'-P-CCNC: 16 U/L (ref 0–45)
BILIRUB SERPL-MCNC: 0.4 MG/DL (ref 0.2–1.3)
BUN SERPL-MCNC: 12 MG/DL (ref 7–30)
CALCIUM SERPL-MCNC: 8.8 MG/DL (ref 8.5–10.1)
CHLORIDE SERPL-SCNC: 103 MMOL/L (ref 94–109)
CHOLEST SERPL-MCNC: 191 MG/DL
CO2 SERPL-SCNC: 25 MMOL/L (ref 20–32)
CREAT SERPL-MCNC: 0.71 MG/DL (ref 0.52–1.04)
GFR SERPL CREATININE-BSD FRML MDRD: >90 ML/MIN/{1.73_M2}
GLUCOSE SERPL-MCNC: 92 MG/DL (ref 70–99)
HDLC SERPL-MCNC: 46 MG/DL
HGB BLD-MCNC: 13.1 G/DL (ref 11.7–15.7)
LDLC SERPL CALC-MCNC: 89 MG/DL
NONHDLC SERPL-MCNC: 145 MG/DL
POTASSIUM SERPL-SCNC: 3.9 MMOL/L (ref 3.4–5.3)
PROT SERPL-MCNC: 7.6 G/DL (ref 6.8–8.8)
SODIUM SERPL-SCNC: 136 MMOL/L (ref 133–144)
SPECIMEN SOURCE: NORMAL
T4 FREE SERPL-MCNC: 0.83 NG/DL (ref 0.76–1.46)
TRIGL SERPL-MCNC: 279 MG/DL
TSH SERPL DL<=0.005 MIU/L-ACNC: 4.93 MU/L (ref 0.4–4)
WET PREP SPEC: NORMAL

## 2019-01-02 PROCEDURE — G0145 SCR C/V CYTO,THINLAYER,RESCR: HCPCS | Performed by: INTERNAL MEDICINE

## 2019-01-02 PROCEDURE — 85018 HEMOGLOBIN: CPT | Performed by: INTERNAL MEDICINE

## 2019-01-02 PROCEDURE — 80053 COMPREHEN METABOLIC PANEL: CPT | Performed by: INTERNAL MEDICINE

## 2019-01-02 PROCEDURE — 84443 ASSAY THYROID STIM HORMONE: CPT | Performed by: INTERNAL MEDICINE

## 2019-01-02 PROCEDURE — 80061 LIPID PANEL: CPT | Performed by: INTERNAL MEDICINE

## 2019-01-02 PROCEDURE — 84439 ASSAY OF FREE THYROXINE: CPT | Performed by: INTERNAL MEDICINE

## 2019-01-02 PROCEDURE — 36415 COLL VENOUS BLD VENIPUNCTURE: CPT | Performed by: INTERNAL MEDICINE

## 2019-01-02 PROCEDURE — 87210 SMEAR WET MOUNT SALINE/INK: CPT | Performed by: INTERNAL MEDICINE

## 2019-01-02 PROCEDURE — 99396 PREV VISIT EST AGE 40-64: CPT | Performed by: INTERNAL MEDICINE

## 2019-01-02 PROCEDURE — 87624 HPV HI-RISK TYP POOLED RSLT: CPT | Performed by: INTERNAL MEDICINE

## 2019-01-02 RX ORDER — NORETHINDRONE ACETATE AND ETHINYL ESTRADIOL .02; 1 MG/1; MG/1
TABLET ORAL
Qty: 84 TABLET | Refills: 3 | Status: SHIPPED | OUTPATIENT
Start: 2019-01-02 | End: 2020-02-21

## 2019-01-02 ASSESSMENT — ENCOUNTER SYMPTOMS
HEMATOCHEZIA: 0
DIZZINESS: 0
HEMATURIA: 0
HEARTBURN: 0
WEAKNESS: 0
DIARRHEA: 0
CHILLS: 0
COUGH: 0
CONSTIPATION: 0
HEADACHES: 0
SORE THROAT: 0
DYSURIA: 0
NAUSEA: 0
ABDOMINAL PAIN: 0
PALPITATIONS: 0
ARTHRALGIAS: 0
FEVER: 0
JOINT SWELLING: 0
SHORTNESS OF BREATH: 0
FREQUENCY: 0
MYALGIAS: 0
NERVOUS/ANXIOUS: 0
BREAST MASS: 0
PARESTHESIAS: 0
EYE PAIN: 0

## 2019-01-02 ASSESSMENT — MIFFLIN-ST. JEOR: SCORE: 1037.87

## 2019-01-02 NOTE — NURSING NOTE
"/68   Pulse 104   Temp 97.4  F (36.3  C) (Oral)   Resp 17   Ht 1.568 m (5' 1.75\")   Wt 45.9 kg (101 lb 1.6 oz)   LMP 12/18/2018   SpO2 98%   BMI 18.64 kg/m    Patient in for annual female physical.  Alicia Almanza CMA    "

## 2019-01-02 NOTE — PROGRESS NOTES
SUBJECTIVE:   CC: Alida Pérez is an 48 year old woman who presents for preventive health visit.     Physical   Annual:     Getting at least 3 servings of Calcium per day:  Yes    Bi-annual eye exam:  Yes    Dental care twice a year:  Yes    Sleep apnea or symptoms of sleep apnea:  None    Diet:  Regular (no restrictions)    Frequency of exercise:  None    Taking medications regularly:  Yes    Medication side effects:  Not applicable    Additional concerns today:  Yes    PHQ-2 Total Score: 0      Pt c/o vaginal discharge since 2 wks,some odor, no itching. LMP 12/20/18.   Pt is also here requesting refills on Bcpills,        Today's PHQ-2 Score:   PHQ-2 ( 1999 Pfizer) 1/2/2019   Q1: Little interest or pleasure in doing things 0   Q2: Feeling down, depressed or hopeless 0   PHQ-2 Score 0   Q1: Little interest or pleasure in doing things -   Q2: Feeling down, depressed or hopeless -   PHQ-2 Score -       Abuse: Current or Past(Physical, Sexual or Emotional)- No  Do you feel safe in your environment? Yes      Past Medical History:   Diagnosis Date     NO ACTIVE PROBLEMS        Past Surgical History:   Procedure Laterality Date     AS ENLARGE BREAST WITH IMPLANT  2014     EXCISE GANGLION WRIST  2009    right hand     MOUTH SURGERY      4 wisdom teeth/plus 4 teeth       Current Outpatient Medications   Medication Sig Dispense Refill     norethindrone-ethinyl estradiol (MICROGESTIN 1/20) 1-20 MG-MCG tablet TAKE 1 TABLET BY MOUTH DAILY AS DIRECTED 84 tablet 3     CALCIUM + D 600-200 MG-UNIT PO TABS 500 mg tablet once daily 200 Tab 3       Family History   Problem Relation Age of Onset     Allergies Mother         and Asthma     Respiratory Mother      Lipids Father      Cancer Maternal Grandmother         cervical     Cerebrovascular Disease Paternal Grandfather        Social History     Tobacco Use     Smoking status: Never Smoker     Smokeless tobacco: Never Used   Substance Use Topics     Alcohol use: No     Alcohol  "Use 12/30/2018   If you drink alcohol do you typically have greater than 3 drinks per day OR greater than 7 drinks per week? No   No flowsheet data found.    Reviewed orders with patient.  Reviewed health maintenance and updated orders accordingly - Yes      Pertinent mammograms are reviewed under the imaging tab.  History of abnormal Pap smear: NO - age 30-65 PAP every 5 years with negative HPV co-testing recommended  PAP / HPV Latest Ref Rng & Units 11/2/2016 10/2/2015 2/26/2014   PAP - OTHER-NIL, See Result OTHER-NIL, See Result NIL   HPV 16 DNA NEG Negative - -   HPV 18 DNA NEG Negative - -   OTHER HR HPV NEG Negative - -     Reviewed and updated as needed this visit by clinical staff  Tobacco  Allergies  Meds  Med Hx  Surg Hx  Fam Hx  Soc Hx        Reviewed and updated as needed this visit by Provider            Review of Systems   Constitutional: Negative for chills and fever.   HENT: Negative for congestion, ear pain, hearing loss and sore throat.    Eyes: Negative for pain and visual disturbance.   Respiratory: Negative for cough and shortness of breath.    Cardiovascular: Negative for chest pain, palpitations and peripheral edema.   Gastrointestinal: Negative for abdominal pain, constipation, diarrhea, heartburn, hematochezia and nausea.   Breasts:  Negative for tenderness, breast mass and discharge.   Genitourinary: Positive for vaginal discharge. Negative for dysuria, frequency, genital sores, hematuria, pelvic pain, urgency and vaginal bleeding.   Musculoskeletal: Negative for arthralgias, joint swelling and myalgias.   Skin: Negative for rash.   Neurological: Negative for dizziness, weakness, headaches and paresthesias.   Psychiatric/Behavioral: Negative for mood changes. The patient is not nervous/anxious.          OBJECTIVE:   /68   Pulse 104   Temp 97.4  F (36.3  C) (Oral)   Resp 17   Ht 1.568 m (5' 1.75\")   Wt 45.9 kg (101 lb 1.6 oz)   LMP 12/18/2018   SpO2 98%   BMI 18.64 kg/m  "   Physical Exam  GENERAL: healthy, alert and no distress  EYES: Eyes grossly normal to inspection, PERRL and conjunctivae and sclerae normal  HENT: ear canals and TM's normal, nose and mouth without ulcers or lesions  NECK: no adenopathy, no asymmetry, masses, or scars and thyroid normal to palpation  RESP: lungs clear to auscultation - no rales, rhonchi or wheezes  BREAST:bilateral breast implants noted  without masses, tenderness or nipple discharge and no palpable axillary masses or adenopathy  CV: regular rate and rhythm, normal S1 S2, no S3 or S4, no murmur, click or rub, no peripheral edema and peripheral pulses strong  ABDOMEN: soft, nontender, no hepatosplenomegaly, no masses and bowel sounds normal   (female): normal female external genitalia, normal urethral meatus, vaginal mucosa pink, moist, well rugated, and normal cervix/adnexa without masses. Mild white discharge noted, wet prep done. Pap obtained   MS: no gross musculoskeletal defects noted, no edema  NEURO: Normal strength and tone, mentation intact and speech normal  PSYCH: mentation appears normal, affect normal/bright      ASSESSMENT/PLAN:     (Z00.00) Routine history and physical examination of adult  (primary encounter diagnosis)  Plan: Hemoglobin, Comprehensive metabolic panel, Lipid panel reflex to direct LDL Fasting, TSH with free T4 reflex, Pap imaged thin layer screen with HPV - recommended age 30 - 65 years        (select HPV order below), HPV High Risk Types         DNA Cervical, T4 free, T4 free            (N83.201,  N83.202) Cysts of both ovaries  Plan: norethindrone-ethinyl estradiol (MICROGESTIN         1/20) 1-20 MG-MCG tablet refilled..explained clearly about the medication,insructions and side effects.             (N89.8) Vaginal discharge  Plan: Wet prep.pt was told I will contact her after results and proceed accordingly.            COUNSELING:  Reviewed preventive health counseling, as reflected in patient instructions        "Regular exercise       Healthy diet/nutrition    BP Readings from Last 1 Encounters:   01/02/19 102/68     Estimated body mass index is 18.64 kg/m  as calculated from the following:    Height as of this encounter: 1.568 m (5' 1.75\").    Weight as of this encounter: 45.9 kg (101 lb 1.6 oz).         reports that  has never smoked. she has never used smokeless tobacco.      Counseling Resources:  ATP IV Guidelines  Pooled Cohorts Equation Calculator  Breast Cancer Risk Calculator  FRAX Risk Assessment  ICSI Preventive Guidelines  Dietary Guidelines for Americans, 2010  USDA's MyPlate  ASA Prophylaxis  Lung CA Screening    Deena Silvestre MD  [facility]  "

## 2019-01-04 LAB
COPATH REPORT: NORMAL
PAP: NORMAL

## 2019-01-08 LAB
FINAL DIAGNOSIS: NORMAL
HPV HR 12 DNA CVX QL NAA+PROBE: NEGATIVE
HPV16 DNA SPEC QL NAA+PROBE: NEGATIVE
HPV18 DNA SPEC QL NAA+PROBE: NEGATIVE
SPECIMEN DESCRIPTION: NORMAL
SPECIMEN SOURCE CVX/VAG CYTO: NORMAL

## 2019-04-30 ENCOUNTER — TELEPHONE (OUTPATIENT)
Dept: INTERNAL MEDICINE | Facility: CLINIC | Age: 49
End: 2019-04-30

## 2019-05-29 ENCOUNTER — MYC MEDICAL ADVICE (OUTPATIENT)
Dept: INTERNAL MEDICINE | Facility: CLINIC | Age: 49
End: 2019-05-29

## 2019-05-29 DIAGNOSIS — Z11.1 SCREENING EXAMINATION FOR PULMONARY TUBERCULOSIS: Primary | ICD-10-CM

## 2019-05-29 DIAGNOSIS — Z11.1 SCREENING EXAMINATION FOR PULMONARY TUBERCULOSIS: ICD-10-CM

## 2019-05-29 PROCEDURE — 36415 COLL VENOUS BLD VENIPUNCTURE: CPT | Performed by: INTERNAL MEDICINE

## 2019-05-29 PROCEDURE — 86481 TB AG RESPONSE T-CELL SUSP: CPT | Performed by: INTERNAL MEDICINE

## 2019-05-29 NOTE — TELEPHONE ENCOUNTER
See her Edkimo message.     She has been getting Mantoux yearly, but chart states she is unemployed, therefore not needed for employer.

## 2019-05-31 LAB
GAMMA INTERFERON BACKGROUND BLD IA-ACNC: 0.11 IU/ML
M TB IFN-G BLD-IMP: NEGATIVE
M TB IFN-G CD4+ BCKGRND COR BLD-ACNC: 9.29 IU/ML
MITOGEN IGNF BCKGRD COR BLD-ACNC: 0.15 IU/ML
MITOGEN IGNF BCKGRD COR BLD-ACNC: 0.25 IU/ML

## 2019-07-16 ENCOUNTER — TELEPHONE (OUTPATIENT)
Dept: INTERNAL MEDICINE | Facility: CLINIC | Age: 49
End: 2019-07-16

## 2019-07-16 DIAGNOSIS — Z13.29 SCREENING FOR THYROID DISORDER: Primary | ICD-10-CM

## 2019-07-16 NOTE — TELEPHONE ENCOUNTER
Pt calling requesting a thyroid lab test be ordered. She stated she was told to recheck it by Dr. Silvestre a few months ago. Pt can be reached at [phone]. OK to leave a detailed message. Please advise. Thanks.

## 2019-07-17 NOTE — TELEPHONE ENCOUNTER
TSH order done.   Attempted to contact pt. Left message to call clinic.   OK to schedule Lab appt     Viewed by Alida Pérez on 5/31/2019  2:16 PM   Written by Deena Silvestre MD on 1/17/2019 10:44 PM   Slightly above normal thyroid test-TSH but T4 is normal, will recheck TSH and T4 in 8 weeks.     TSH   Date Value Ref Range Status   01/02/2019 4.93 (H) 0.40 - 4.00 mU/L Final

## 2019-07-18 DIAGNOSIS — Z13.29 SCREENING FOR THYROID DISORDER: ICD-10-CM

## 2019-07-18 PROCEDURE — 84443 ASSAY THYROID STIM HORMONE: CPT | Performed by: INTERNAL MEDICINE

## 2019-07-18 PROCEDURE — 36415 COLL VENOUS BLD VENIPUNCTURE: CPT | Performed by: INTERNAL MEDICINE

## 2019-07-19 LAB — TSH SERPL DL<=0.005 MIU/L-ACNC: 2.12 MU/L (ref 0.4–4)

## 2019-07-24 ENCOUNTER — OFFICE VISIT (OUTPATIENT)
Dept: INTERNAL MEDICINE | Facility: CLINIC | Age: 49
End: 2019-07-24
Payer: COMMERCIAL

## 2019-07-24 VITALS
BODY MASS INDEX: 19.48 KG/M2 | TEMPERATURE: 98.4 F | RESPIRATION RATE: 16 BRPM | DIASTOLIC BLOOD PRESSURE: 70 MMHG | SYSTOLIC BLOOD PRESSURE: 120 MMHG | WEIGHT: 105.65 LBS

## 2019-07-24 DIAGNOSIS — S40.021A ARM BRUISE, RIGHT, INITIAL ENCOUNTER: Primary | ICD-10-CM

## 2019-07-24 PROCEDURE — 99213 OFFICE O/P EST LOW 20 MIN: CPT | Performed by: PHYSICIAN ASSISTANT

## 2019-07-24 NOTE — PATIENT INSTRUCTIONS
Icing today   Then heat after that to help the area heal.    Monitor  IF other bruising, - multiple bruises in places that you could never injure ( like the middle of the back or the abdomin)   Bleeding gums or nose bleeds then recheck and would recommend labs to be done.

## 2019-07-24 NOTE — PROGRESS NOTES
Subjective     Alida Pérez is a 48 year old female who presents to clinic today for the following health issues:    HPI   Brusing      Duration: x1 day    Description (location/character/radiation): right arm    Intensity:  Tender to the touch    Accompanying signs and symptoms: none    History (similar episodes/previous evaluation): None    Precipitating or alleviating factors: None    Therapies tried and outcome: None     -------------------------------------    BP Readings from Last 3 Encounters:   07/24/19 120/70   01/02/19 102/68   01/30/18 104/62    Wt Readings from Last 3 Encounters:   07/24/19 47.9 kg (105 lb 10.4 oz)   01/02/19 45.9 kg (101 lb 1.6 oz)   01/30/18 44.3 kg (97 lb 11.2 oz)                    -------------------------------------  Reviewed and updated as needed this visit by Provider  Allergies  Meds         Review of Systems   ROS COMP: Constitutional, HEENT, cardiovascular, pulmonary, gi and gu systems are negative, except as otherwise noted.      Objective    /70 (BP Location: Left arm, Patient Position: Chair, Cuff Size: Adult Regular)   Temp 98.4  F (36.9  C) (Oral)   Resp 16   Wt 47.9 kg (105 lb 10.4 oz)   LMP 06/24/2019   BMI 19.48 kg/m    Body mass index is 19.48 kg/m .  Physical Exam   GENERAL: healthy, alert and no distress  HENT: normal cephalic/atraumatic and oropharynx clear  RESP: lungs clear to auscultation - no rales, rhonchi or wheezes  CV: regular rates and rhythm and normal S1 S2, no S3 or S4  MS: no gross musculoskeletal defects noted, no edema  SKIN: purple to blueish colored ecchymosis noted right anticubital area  tender    Diagnostic Test Results:  none         Assessment & Plan     1. Arm bruise, right, initial encounter           Patient Instructions   Icing today   Then heat after that to help the area heal.    Monitor  IF other bruising, - multiple bruises in places that you could never injure ( like the middle of the back or the abdomin)   Bleeding gums  or nose bleeds then recheck and would recommend labs to be done.          Return in about 6 months (around 1/24/2020) for Routine Visit, regular primary provider.    Coreen Smith PA-C  [facility]

## 2019-10-03 ENCOUNTER — HEALTH MAINTENANCE LETTER (OUTPATIENT)
Age: 49
End: 2019-10-03

## 2020-01-07 DIAGNOSIS — N83.202 CYSTS OF BOTH OVARIES: ICD-10-CM

## 2020-01-07 DIAGNOSIS — N83.201 CYSTS OF BOTH OVARIES: ICD-10-CM

## 2020-01-07 NOTE — TELEPHONE ENCOUNTER
"Requested Prescriptions   Pending Prescriptions Disp Refills     norethindrone-ethinyl estradiol (MICROGESTIN 1/20) 1-20 MG-MCG tablet   [Pharmacy Med Name: NORETHINDRONE ACET/ETH 1/20 TB 21'S]    Last Written Prescription Date:  1/2/19  Last Fill Quantity: 84,  # refills: 3   Last office visit: 7/24/2019 with prescribing provider:  Sarah   Future Office Visit:     84 tablet 3     Sig: TAKE 1 TABLET BY MOUTH DAILY AS DIRECTED       Contraceptives Protocol Failed - 1/7/2020  2:56 PM        Failed - Recent (12 mo) or future (30 days) visit within the authorizing provider's specialty     Patient has had an office visit with the authorizing provider or a provider within the authorizing providers department within the previous 12 mos or has a future within next 30 days. See \"Patient Info\" tab in inbasket, or \"Choose Columns\" in Meds & Orders section of the refill encounter.              Passed - Patient is not a current smoker if age is 35 or older        Passed - Medication is active on med list        Passed - No active pregnancy on record        Passed - No positive pregnancy test in past 12 months           "

## 2020-01-09 NOTE — TELEPHONE ENCOUNTER
Patient due for an office visit.  Called patient and left detailed message (ok per ctc) that she needs to schedule an appointment.     Please assist in scheduling an appointment and route back to ri refills.  Thanks.   Medication can be refilled x1 when patient schedules.

## 2020-01-15 NOTE — TELEPHONE ENCOUNTER
Left a detailed message per consent to communicate informing patient that she will need to have an appointment on file for a refill.

## 2020-01-17 ENCOUNTER — HOSPITAL ENCOUNTER (OUTPATIENT)
Dept: MAMMOGRAPHY | Facility: CLINIC | Age: 50
Discharge: HOME | End: 2020-01-17
Attending: INTERNAL MEDICINE | Admitting: INTERNAL MEDICINE
Payer: COMMERCIAL

## 2020-01-17 DIAGNOSIS — Z12.31 VISIT FOR SCREENING MAMMOGRAM: ICD-10-CM

## 2020-01-17 PROCEDURE — 77063 BREAST TOMOSYNTHESIS BI: CPT

## 2020-01-21 RX ORDER — NORETHINDRONE ACETATE AND ETHINYL ESTRADIOL .02; 1 MG/1; MG/1
TABLET ORAL
Qty: 84 TABLET | Refills: 3 | OUTPATIENT
Start: 2020-01-21

## 2020-02-08 ENCOUNTER — HEALTH MAINTENANCE LETTER (OUTPATIENT)
Age: 50
End: 2020-02-08

## 2020-02-21 ENCOUNTER — OFFICE VISIT (OUTPATIENT)
Dept: INTERNAL MEDICINE | Facility: CLINIC | Age: 50
End: 2020-02-21
Payer: COMMERCIAL

## 2020-02-21 VITALS
SYSTOLIC BLOOD PRESSURE: 112 MMHG | RESPIRATION RATE: 20 BRPM | HEIGHT: 62 IN | HEART RATE: 86 BPM | DIASTOLIC BLOOD PRESSURE: 70 MMHG | WEIGHT: 98.6 LBS | OXYGEN SATURATION: 99 % | TEMPERATURE: 97.6 F | BODY MASS INDEX: 18.14 KG/M2

## 2020-02-21 DIAGNOSIS — N83.201 CYSTS OF BOTH OVARIES: ICD-10-CM

## 2020-02-21 DIAGNOSIS — Z00.00 ROUTINE HISTORY AND PHYSICAL EXAMINATION OF ADULT: Primary | ICD-10-CM

## 2020-02-21 DIAGNOSIS — N89.8 VAGINAL DISCHARGE: ICD-10-CM

## 2020-02-21 DIAGNOSIS — N83.202 CYSTS OF BOTH OVARIES: ICD-10-CM

## 2020-02-21 LAB
ALBUMIN SERPL-MCNC: 3.5 G/DL (ref 3.4–5)
ALP SERPL-CCNC: 38 U/L (ref 40–150)
ALT SERPL W P-5'-P-CCNC: 18 U/L (ref 0–50)
ANION GAP SERPL CALCULATED.3IONS-SCNC: 5 MMOL/L (ref 3–14)
AST SERPL W P-5'-P-CCNC: 13 U/L (ref 0–45)
BILIRUB SERPL-MCNC: 0.7 MG/DL (ref 0.2–1.3)
BUN SERPL-MCNC: 13 MG/DL (ref 7–30)
CALCIUM SERPL-MCNC: 8.8 MG/DL (ref 8.5–10.1)
CHLORIDE SERPL-SCNC: 107 MMOL/L (ref 94–109)
CHOLEST SERPL-MCNC: 189 MG/DL
CO2 SERPL-SCNC: 26 MMOL/L (ref 20–32)
CREAT SERPL-MCNC: 0.79 MG/DL (ref 0.52–1.04)
GFR SERPL CREATININE-BSD FRML MDRD: 88 ML/MIN/{1.73_M2}
GLUCOSE SERPL-MCNC: 87 MG/DL (ref 70–99)
HDLC SERPL-MCNC: 50 MG/DL
HGB BLD-MCNC: 13.4 G/DL (ref 11.7–15.7)
LDLC SERPL CALC-MCNC: 104 MG/DL
NONHDLC SERPL-MCNC: 139 MG/DL
POTASSIUM SERPL-SCNC: 3.6 MMOL/L (ref 3.4–5.3)
PROT SERPL-MCNC: 7.8 G/DL (ref 6.8–8.8)
SODIUM SERPL-SCNC: 138 MMOL/L (ref 133–144)
SPECIMEN SOURCE: NORMAL
TRIGL SERPL-MCNC: 174 MG/DL
TSH SERPL DL<=0.005 MIU/L-ACNC: 2.46 MU/L (ref 0.4–4)
WET PREP SPEC: NORMAL

## 2020-02-21 PROCEDURE — 85018 HEMOGLOBIN: CPT | Performed by: INTERNAL MEDICINE

## 2020-02-21 PROCEDURE — 99396 PREV VISIT EST AGE 40-64: CPT | Performed by: INTERNAL MEDICINE

## 2020-02-21 PROCEDURE — 36415 COLL VENOUS BLD VENIPUNCTURE: CPT | Performed by: INTERNAL MEDICINE

## 2020-02-21 PROCEDURE — 84443 ASSAY THYROID STIM HORMONE: CPT | Performed by: INTERNAL MEDICINE

## 2020-02-21 PROCEDURE — 80061 LIPID PANEL: CPT | Performed by: INTERNAL MEDICINE

## 2020-02-21 PROCEDURE — 80053 COMPREHEN METABOLIC PANEL: CPT | Performed by: INTERNAL MEDICINE

## 2020-02-21 PROCEDURE — 87491 CHLMYD TRACH DNA AMP PROBE: CPT | Performed by: INTERNAL MEDICINE

## 2020-02-21 PROCEDURE — 87591 N.GONORRHOEAE DNA AMP PROB: CPT | Performed by: INTERNAL MEDICINE

## 2020-02-21 PROCEDURE — 87210 SMEAR WET MOUNT SALINE/INK: CPT | Performed by: INTERNAL MEDICINE

## 2020-02-21 RX ORDER — NORETHINDRONE ACETATE AND ETHINYL ESTRADIOL .02; 1 MG/1; MG/1
TABLET ORAL
Qty: 84 TABLET | Refills: 3 | Status: SHIPPED | OUTPATIENT
Start: 2020-02-21 | End: 2020-12-14

## 2020-02-21 RX ORDER — TRETINOIN 0.5 MG/G
CREAM TOPICAL AT BEDTIME
COMMUNITY
End: 2024-05-06

## 2020-02-21 RX ORDER — MULTIVIT-MIN/IRON/FOLIC ACID/K 18-600-40
CAPSULE ORAL
COMMUNITY
End: 2020-08-13

## 2020-02-21 ASSESSMENT — MIFFLIN-ST. JEOR: SCORE: 1025.5

## 2020-02-21 NOTE — PROGRESS NOTES
SUBJECTIVE:   CC: Alida Pérez is an 49 year old woman who presents for preventive health visit.     Healthy Habits:     Getting at least 3 servings of Calcium per day:  Yes    Bi-annual eye exam:  Yes    Dental care twice a year:  Yes    Sleep apnea or symptoms of sleep apnea:  None    Diet:  Regular (no restrictions)    Frequency of exercise:  None    Taking medications regularly:  Yes    Medication side effects:  None    PHQ-2 Total Score: 0    Additional concerns today:  Yes        Hyperlipidemia Follow-Up      Are you regularly taking any medication or supplement to lower your cholesterol?   No    Are you having muscle aches or other side effects that you think could be caused by your cholesterol lowering medication?  NA    Patient complains of having vaginal discharge and odor since 2 weeks, also has some itching.        Today's PHQ-2 Score:   PHQ-2 ( 1999 Pfizer) 2/19/2020   Q1: Little interest or pleasure in doing things 0   Q2: Feeling down, depressed or hopeless 0   PHQ-2 Score 0   Q1: Little interest or pleasure in doing things Not at all   Q2: Feeling down, depressed or hopeless Not at all   PHQ-2 Score 0       Abuse: Current or Past(Physical, Sexual or Emotional)- No  Do you feel safe in your environment? Yes      Past Medical History:   Diagnosis Date     NO ACTIVE PROBLEMS        Past Surgical History:   Procedure Laterality Date     AS ENLARGE BREAST WITH IMPLANT  2014     EXCISE GANGLION WRIST  2009    right hand     MOUTH SURGERY      4 wisdom teeth/plus 4 teeth     Current Outpatient Medications   Medication Sig Dispense Refill     Ascorbic Acid (VITAMIN C) 500 MG PO CAPS        CALCIUM + D 600-200 MG-UNIT PO TABS 500 mg tablet once daily 200 Tab 3     norethindrone-ethinyl estradiol 1-20 MG-MCG PO tablet TAKE 1 TABLET BY MOUTH DAILY AS DIRECTED 84 tablet 3     tretinoin 0.05 % EX external cream Apply topically At Bedtime         Family History   Problem Relation Age of Onset     Allergies  Mother         and Asthma     Respiratory Mother      Lipids Father      Cancer Maternal Grandmother         cervical     Cerebrovascular Disease Paternal Grandfather        Social History     Tobacco Use     Smoking status: Never Smoker     Smokeless tobacco: Never Used   Substance Use Topics     Alcohol use: No     If you drink alcohol do you typically have >3 drinks per day or >7 drinks per week? No    Alcohol Use 2/19/2020   Prescreen: >3 drinks/day or >7 drinks/week? No   Prescreen: >3 drinks/day or >7 drinks/week? -   No flowsheet data found.    Reviewed orders with patient.  Reviewed health maintenance and updated orders accordingly - Yes       Pertinent mammograms are reviewed under the imaging tab.  History of abnormal Pap smear: NO - age 30-65 PAP every 5 years with negative HPV co-testing recommended  PAP / HPV Latest Ref Rng & Units 1/2/2019 11/2/2016 10/2/2015   PAP - NIL OTHER-NIL, See Result OTHER-NIL, See Result   HPV 16 DNA NEG:Negative Negative Negative -   HPV 18 DNA NEG:Negative Negative Negative -   OTHER HR HPV NEG:Negative Negative Negative -     Reviewed and updated as needed this visit by clinical staff         Reviewed and updated as needed this visit by Provider          ROS:   CONSTITUTIONAL: NEGATIVE for fever, chills, change in weight  INTEGUMENTARU/SKIN: NEGATIVE for worrisome rashes, moles or lesions  EYES: NEGATIVE for vision changes or irritation  ENT: NEGATIVE for ear, mouth and throat problems  RESP: NEGATIVE for significant cough or SOB  BREAST: NEGATIVE for masses, tenderness or discharge  CV: NEGATIVE for chest pain, palpitations or peripheral edema  GI: NEGATIVE for nausea, abdominal pain, heartburn, or change in bowel habits   female: vaginal discharge and itching   MUSCULOSKELETAL: NEGATIVE for significant arthralgias or myalgia  NEURO: NEGATIVE for weakness, dizziness or paresthesias  PSYCHIATRIC: NEGATIVE for changes in mood or affect     OBJECTIVE:   /70   Pulse  "86   Temp 97.6  F (36.4  C) (Oral)   Resp 20   Ht 1.575 m (5' 2\")   Wt 44.7 kg (98 lb 9.6 oz)   LMP 02/11/2020   SpO2 99%   BMI 18.03 kg/m    Physical Exam  GENERAL: healthy, alert and no distress  EYES: Eyes grossly normal to inspection, PERRL and conjunctivae and sclerae normal  HENT: ear canals and TM's normal, nose and mouth without ulcers or lesions  NECK: no adenopathy, no asymmetry, masses, or scars and thyroid normal to palpation  RESP: lungs clear to auscultation - no rales, rhonchi or wheezes  CV: regular rate and rhythm, normal S1 S2, no S3 or S4, no murmur, click or rub, no peripheral edema and peripheral pulses strong  ABDOMEN: soft, nontender, no hepatosplenomegaly, no masses and bowel sounds normal   (female): normal female external genitalia, normal urethral meatus, vaginal mucosa, normal cervix/adnexa/ without masses, scant mucus discharge noted wet prep done and cultures done for chlamydia/ gonorrhea  MS: no gross musculoskeletal defects noted, no edema  NEURO: Normal strength and tone, mentation intact and speech normal  PSYCH: mentation appears normal, affect normal/bright       ASSESSMENT/PLAN:     (Z00.00) Routine history and physical examination of adult  (primary encounter diagnosis)  Plan: Hemoglobin, Comprehensive metabolic panel,         Lipid panel reflex to direct LDL Fasting, TSH         with free T4 reflex            (N83.201,  N83.202) Cysts of both ovaries  Plan: norethindrone-ethinyl estradiol 1-20 MG-MCG PO         Tablet refilled.explained clearly about the medication,insructions and side effects.             (N89.8) Vaginal discharge  Plan: Wet prep, NEISSERIA GONORRHOEA PCR, CHLAMYDIA         TRACHOMATIS PCR.pt was told I will contact her after results and proceed accordingly.              COUNSELING:  Reviewed preventive health counseling, as reflected in patient instructions       Regular exercise       Healthy diet/nutrition    Estimated body mass index is 19.48 " "kg/m  as calculated from the following:    Height as of 1/2/19: 1.568 m (5' 1.75\").    Weight as of 7/24/19: 47.9 kg (105 lb 10.4 oz).         reports that she has never smoked. She has never used smokeless tobacco.      Counseling Resources:  ATP IV Guidelines  Pooled Cohorts Equation Calculator  Breast Cancer Risk Calculator  FRAX Risk Assessment  ICSI Preventive Guidelines  Dietary Guidelines for Americans, 2010  USDA's MyPlate  ASA Prophylaxis  Lung CA Screening    Deena Silvestre MD  [facility]  "

## 2020-02-21 NOTE — NURSING NOTE
"/70   Pulse 86   Temp 97.6  F (36.4  C) (Oral)   Resp 20   Ht 1.575 m (5' 2\")   Wt 44.7 kg (98 lb 9.6 oz)   LMP 02/11/2020   SpO2 99%   BMI 18.03 kg/m    Patient in for annual Female Physical.  Alicia Almanza CMA    "

## 2020-02-23 LAB
C TRACH DNA SPEC QL NAA+PROBE: NEGATIVE
N GONORRHOEA DNA SPEC QL NAA+PROBE: NEGATIVE
SPECIMEN SOURCE: NORMAL
SPECIMEN SOURCE: NORMAL

## 2020-05-26 ENCOUNTER — ALLIED HEALTH/NURSE VISIT (OUTPATIENT)
Dept: NURSING | Facility: CLINIC | Age: 50
End: 2020-05-26
Payer: COMMERCIAL

## 2020-05-26 DIAGNOSIS — Z23 NEED FOR VACCINATION: Primary | ICD-10-CM

## 2020-05-26 PROCEDURE — 86580 TB INTRADERMAL TEST: CPT

## 2020-05-28 ENCOUNTER — ALLIED HEALTH/NURSE VISIT (OUTPATIENT)
Dept: NURSING | Facility: CLINIC | Age: 50
End: 2020-05-28
Payer: COMMERCIAL

## 2020-05-28 DIAGNOSIS — Z11.1 SCREENING EXAMINATION FOR PULMONARY TUBERCULOSIS: Primary | ICD-10-CM

## 2020-05-28 LAB
PPDINDURATION: 0 MM (ref 0–5)
PPDREDNESS: 0 MM

## 2020-05-28 NOTE — PROGRESS NOTES
Mantoux result:  Lab Results   Component Value Date    PPDREDNESS 0 05/28/2020    PPDINDURATIO 0 05/28/2020     Is induration greater than 5mm?  No    Printed results and handed them to patient per request.

## 2020-05-29 ENCOUNTER — TELEPHONE (OUTPATIENT)
Dept: INTERNAL MEDICINE | Facility: CLINIC | Age: 50
End: 2020-05-29

## 2020-05-29 DIAGNOSIS — Z11.1 SCREENING EXAMINATION FOR PULMONARY TUBERCULOSIS: ICD-10-CM

## 2020-05-29 DIAGNOSIS — Z11.1 SCREENING EXAMINATION FOR PULMONARY TUBERCULOSIS: Primary | ICD-10-CM

## 2020-05-29 PROCEDURE — 36415 COLL VENOUS BLD VENIPUNCTURE: CPT | Performed by: INTERNAL MEDICINE

## 2020-05-29 PROCEDURE — 86481 TB AG RESPONSE T-CELL SUSP: CPT | Performed by: INTERNAL MEDICINE

## 2020-05-29 NOTE — TELEPHONE ENCOUNTER
[facility] lab calls stating that pt is there asking for TB Gold lab test.     She just had Mantoux done that was negative yesterday.     Reviewed with Dr Silvestre. She states this is OK for her to have lab test for her work. She is .     Order placed per VO from Dr Silvestre.     Relayed this to . He agrees.

## 2020-06-01 LAB
GAMMA INTERFERON BACKGROUND BLD IA-ACNC: 0.07 IU/ML
M TB IFN-G BLD-IMP: NEGATIVE
M TB IFN-G CD4+ BCKGRND COR BLD-ACNC: >10 IU/ML
MITOGEN IGNF BCKGRD COR BLD-ACNC: 0.11 IU/ML
MITOGEN IGNF BCKGRD COR BLD-ACNC: 0.32 IU/ML

## 2020-08-13 ENCOUNTER — TELEPHONE (OUTPATIENT)
Dept: INTERNAL MEDICINE | Facility: CLINIC | Age: 50
End: 2020-08-13

## 2020-08-13 ENCOUNTER — VIRTUAL VISIT (OUTPATIENT)
Dept: INTERNAL MEDICINE | Facility: CLINIC | Age: 50
End: 2020-08-13
Payer: COMMERCIAL

## 2020-08-13 DIAGNOSIS — Z71.89 ADVICE GIVEN ABOUT COVID-19 VIRUS INFECTION: Primary | ICD-10-CM

## 2020-08-13 DIAGNOSIS — Z71.89 ADVICE GIVEN ABOUT COVID-19 VIRUS INFECTION: ICD-10-CM

## 2020-08-13 PROCEDURE — 99213 OFFICE O/P EST LOW 20 MIN: CPT | Mod: GT | Performed by: INTERNAL MEDICINE

## 2020-08-13 PROCEDURE — U0003 INFECTIOUS AGENT DETECTION BY NUCLEIC ACID (DNA OR RNA); SEVERE ACUTE RESPIRATORY SYNDROME CORONAVIRUS 2 (SARS-COV-2) (CORONAVIRUS DISEASE [COVID-19]), AMPLIFIED PROBE TECHNIQUE, MAKING USE OF HIGH THROUGHPUT TECHNOLOGIES AS DESCRIBED BY CMS-2020-01-R: HCPCS | Performed by: INTERNAL MEDICINE

## 2020-08-13 NOTE — PROGRESS NOTES
"Alida Pérez is a 49 year old female who is being evaluated via a billable video visit.      The patient has been notified of following:     \"This video visit will be conducted via a call between you and your physician/provider. We have found that certain health care needs can be provided without the need for an in-person physical exam.  This service lets us provide the care you need with a video conversation.  If a prescription is necessary we can send it directly to your pharmacy.  If lab work is needed we can place an order for that and you can then stop by our lab to have the test done at a later time.    Video visits are billed at different rates depending on your insurance coverage.  Please reach out to your insurance provider with any questions.    If during the course of the call the physician/provider feels a video visit is not appropriate, you will not be charged for this service.\"    Patient has given verbal consent for Video visit? Yes  How would you like to obtain your AVS? MyChart  If you are dropped from the video visit, the video invite should be resent to: Text to cell phone: [phone]  Will anyone else be joining your video visit? 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 Denies symptoms.       Video Start Time: 8:07 AM    HPI:   as above. 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 She feels fine. Her Covid score is 0 and her  is not high risk as far as she knows. But she would like a Covid test. And she would like her  tested.    Patient Active Problem List   Diagnosis     Screening examination for pulmonary tuberculosis     Screening for thyroid disorder     Hyperlipidemia     Ovarian cyst     HYPERLIPIDEMIA LDL GOAL <160     Vitamin D deficiency     Past Surgical " History:   Procedure Laterality Date     AS ENLARGE BREAST WITH IMPLANT  2014     EXCISE GANGLION WRIST  2009    right hand     MOUTH SURGERY      4 wisdom teeth/plus 4 teeth       Social History     Tobacco Use     Smoking status: Never Smoker     Smokeless tobacco: Never Used   Substance Use Topics     Alcohol use: No     Family History   Problem Relation Age of Onset     Allergies Mother         and Asthma     Respiratory Mother      Lipids Father      Cancer Maternal Grandmother         cervical     Cerebrovascular Disease Paternal Grandfather            Reviewed and updated as needed this visit by Provider         Review of Systems   Constitutional, HEENT, cardiovascular, pulmonary, GI, , musculoskeletal, neuro, skin, endocrine and psych systems are negative, except as otherwise noted.      Objective           Vitals:  No vitals were obtained today due to virtual visit.    Physical Exam     GENERAL: Healthy, alert and no distress  EYES: Eyes grossly normal to inspection.  No discharge or erythema, or obvious scleral/conjunctival abnormalities.  RESP: No audible wheeze, cough, or visible cyanosis.  No visible retractions or increased work of breathing.    SKIN: Visible skin clear. No significant rash, abnormal pigmentation or lesions.  NEURO: Cranial nerves grossly intact.  Mentation and speech appropriate for age.  PSYCH: Mentation appears normal, affect normal/bright, judgement and insight intact, normal speech and appearance well-groomed.              Assessment & Plan     1. Advice given about COVID-19 virus infection  She is at low risk being asymptomatic and with Covid score of 0 advised her she would be low on the priority for scheduling. Her  should call his doctor if he wants to be tested.  - Asymptomatic COVID-19 Virus (Coronavirus) by PCR; Future       No follow-ups on file.    Lynda Harrington MD  [facility]      Video-Visit Details    Type of service:  Video  Visit    Video End Time:8:14 am    Originating Location (pt. Location): Home    Distant Location (provider location):  [facility]     Platform used for Video Visit: Nguyễn

## 2020-08-13 NOTE — TELEPHONE ENCOUNTER
Alida Pérez would like to request a referral.  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     Thanks.

## 2020-08-14 ENCOUNTER — NURSE TRIAGE (OUTPATIENT)
Dept: NURSING | Facility: CLINIC | Age: 50
End: 2020-08-14

## 2020-08-14 NOTE — TELEPHONE ENCOUNTER
Coronavirus (COVID-19) Notification     Reason for call  Patient requesting results     Lab Result    Lab test 2019-nCoV rRt-PCR in process        RN Recommendations/Instructions per [facility]  Continue quarantee and following instructions until you receive the results     Please Contact your PCP clinic or return to the Emergency department if your:    Symptoms worsen or other concerning symptom's.     Patient informed that if test for COVID19 is POSITIVE,  you will receive a call typically within 48 hours from the test date (date lab collected).  If NEGATIVE result, you will receive a letter in the mail or [facility].      [RN/LPN Name]  Maria Eugenia Bojorquez RN  FNA

## 2020-08-15 LAB
SARS-COV-2 RNA SPEC QL NAA+PROBE: NOT DETECTED
SPECIMEN SOURCE: NORMAL

## 2020-11-07 ENCOUNTER — HEALTH MAINTENANCE LETTER (OUTPATIENT)
Age: 50
End: 2020-11-07

## 2021-01-27 ENCOUNTER — HOSPITAL ENCOUNTER (OUTPATIENT)
Dept: MAMMOGRAPHY | Facility: CLINIC | Age: 51
Discharge: HOME | End: 2021-01-27
Attending: INTERNAL MEDICINE | Admitting: INTERNAL MEDICINE
Payer: COMMERCIAL

## 2021-01-27 DIAGNOSIS — Z12.31 OTHER SCREENING MAMMOGRAM: ICD-10-CM

## 2021-01-27 PROCEDURE — 77063 BREAST TOMOSYNTHESIS BI: CPT

## 2021-02-25 ASSESSMENT — ENCOUNTER SYMPTOMS
FREQUENCY: 1
BREAST MASS: 0
EYE PAIN: 1
MYALGIAS: 1

## 2021-03-02 ENCOUNTER — OFFICE VISIT (OUTPATIENT)
Dept: INTERNAL MEDICINE | Facility: CLINIC | Age: 51
End: 2021-03-02
Payer: COMMERCIAL

## 2021-03-02 VITALS
WEIGHT: 94.4 LBS | DIASTOLIC BLOOD PRESSURE: 62 MMHG | HEIGHT: 62 IN | BODY MASS INDEX: 17.37 KG/M2 | RESPIRATION RATE: 13 BRPM | SYSTOLIC BLOOD PRESSURE: 106 MMHG | TEMPERATURE: 98.4 F | HEART RATE: 101 BPM | OXYGEN SATURATION: 100 %

## 2021-03-02 DIAGNOSIS — N89.8 VAGINAL DISCHARGE: ICD-10-CM

## 2021-03-02 DIAGNOSIS — Z00.00 ROUTINE HISTORY AND PHYSICAL EXAMINATION OF ADULT: Primary | ICD-10-CM

## 2021-03-02 DIAGNOSIS — E78.2 MIXED HYPERLIPIDEMIA: ICD-10-CM

## 2021-03-02 DIAGNOSIS — R30.0 DYSURIA: ICD-10-CM

## 2021-03-02 LAB
ALBUMIN UR-MCNC: NEGATIVE MG/DL
APPEARANCE UR: CLEAR
BACTERIA #/AREA URNS HPF: ABNORMAL /HPF
BILIRUB UR QL STRIP: NEGATIVE
COLOR UR AUTO: YELLOW
GLUCOSE UR STRIP-MCNC: NEGATIVE MG/DL
HGB BLD-MCNC: 13.8 G/DL (ref 11.7–15.7)
HGB UR QL STRIP: ABNORMAL
KETONES UR STRIP-MCNC: NEGATIVE MG/DL
LEUKOCYTE ESTERASE UR QL STRIP: NEGATIVE
MUCOUS THREADS #/AREA URNS LPF: PRESENT /LPF
NITRATE UR QL: NEGATIVE
NON-SQ EPI CELLS #/AREA URNS LPF: ABNORMAL /LPF
PH UR STRIP: 5.5 PH (ref 5–7)
RBC #/AREA URNS AUTO: ABNORMAL /HPF
SOURCE: ABNORMAL
SP GR UR STRIP: >1.03 (ref 1–1.03)
SPECIMEN SOURCE: NORMAL
UROBILINOGEN UR STRIP-ACNC: 0.2 EU/DL (ref 0.2–1)
WBC #/AREA URNS AUTO: ABNORMAL /HPF
WET PREP SPEC: NORMAL

## 2021-03-02 PROCEDURE — 80061 LIPID PANEL: CPT | Performed by: INTERNAL MEDICINE

## 2021-03-02 PROCEDURE — 85018 HEMOGLOBIN: CPT | Performed by: INTERNAL MEDICINE

## 2021-03-02 PROCEDURE — 99213 OFFICE O/P EST LOW 20 MIN: CPT | Mod: 25 | Performed by: INTERNAL MEDICINE

## 2021-03-02 PROCEDURE — 80053 COMPREHEN METABOLIC PANEL: CPT | Performed by: INTERNAL MEDICINE

## 2021-03-02 PROCEDURE — 81001 URINALYSIS AUTO W/SCOPE: CPT | Performed by: INTERNAL MEDICINE

## 2021-03-02 PROCEDURE — 36415 COLL VENOUS BLD VENIPUNCTURE: CPT | Performed by: INTERNAL MEDICINE

## 2021-03-02 PROCEDURE — 99396 PREV VISIT EST AGE 40-64: CPT | Performed by: INTERNAL MEDICINE

## 2021-03-02 PROCEDURE — 87210 SMEAR WET MOUNT SALINE/INK: CPT | Performed by: INTERNAL MEDICINE

## 2021-03-02 ASSESSMENT — ENCOUNTER SYMPTOMS
CONSTITUTIONAL NEGATIVE: 1
NEUROLOGICAL NEGATIVE: 1
BREAST MASS: 0
RESPIRATORY NEGATIVE: 1
ENDOCRINE NEGATIVE: 1
EYES NEGATIVE: 1
PSYCHIATRIC NEGATIVE: 1
CARDIOVASCULAR NEGATIVE: 1
FREQUENCY: 1
GASTROINTESTINAL NEGATIVE: 1

## 2021-03-02 ASSESSMENT — MIFFLIN-ST. JEOR: SCORE: 1001.45

## 2021-03-02 NOTE — PROGRESS NOTES
SUBJECTIVE:   CC: Alida Pérez is an 50 year old woman who presents for preventive health visit.       Patient has been advised of split billing requirements and indicates understanding: Yes  Healthy Habits:     Getting at least 3 servings of Calcium per day:  Yes    Bi-annual eye exam:  Yes    Dental care twice a year:  Yes    Sleep apnea or symptoms of sleep apnea:  None    Diet:  Low fat/cholesterol and Other    Frequency of exercise:  1 day/week    Duration of exercise:  15-30 minutes    Taking medications regularly:  Yes    Medication side effects:  Not applicable    PHQ-2 Total Score: 0    Additional concerns today:  Yes      Patient complains of having a white thick vaginal discharge on and off for few weeks, also has noticed order, no vaginal itching.    Patient also complains of frequency of urination since few days and also complains of burning.  No urgency, no blood in the urine, no fever or chills.      Hyperlipidemia Follow-Up      Are you regularly taking any medication or supplement to lower your cholesterol?   No    Are you having muscle aches or other side effects that you think could be caused by your cholesterol lowering medication?  NA      Today's PHQ-2 Score:   PHQ-2 ( 1999 Pfizer) 2/25/2021   Q1: Little interest or pleasure in doing things 0   Q2: Feeling down, depressed or hopeless 0   PHQ-2 Score 0   Q1: Little interest or pleasure in doing things Not at all   Q2: Feeling down, depressed or hopeless Not at all   PHQ-2 Score 0       Abuse: Current or Past (Physical, Sexual or Emotional) - No  Do you feel safe in your environment? Yes    Have you ever done Advance Care Planning? (For example, a Health Directive, POLST, or a discussion with a medical provider or your loved ones about your wishes): No, advance care planning information given to patient to review.  Advanced care planning was discussed at today's visit.      Past Medical History:   Diagnosis Date     NO ACTIVE PROBLEMS         Past Surgical History:   Procedure Laterality Date     AS ENLARGE BREAST WITH IMPLANT  2014     EXCISE GANGLION WRIST  2009    right hand     MOUTH SURGERY      4 wisdom teeth/plus 4 teeth       Current Outpatient Medications   Medication Sig Dispense Refill     CALCIUM + D 600-200 MG-UNIT PO TABS 500 mg tablet once daily 200 Tab 3     norethindrone-ethinyl estradiol (MICROGESTIN 1/20) 1-20 MG-MCG tablet TAKE 1 TABLET BY MOUTH DAILY AS DIRECTED 84 tablet 0     tretinoin 0.05 % EX external cream Apply topically At Bedtime         Family History   Problem Relation Age of Onset     Allergies Mother         and Asthma     Respiratory Mother      Lipids Father      Cancer Maternal Grandmother         cervical     Cerebrovascular Disease Paternal Grandfather        Social History     Tobacco Use     Smoking status: Never Smoker     Smokeless tobacco: Never Used   Substance Use Topics     Alcohol use: No     If you drink alcohol do you typically have >3 drinks per day or >7 drinks per week? No    Alcohol Use 2/25/2021   Prescreen: >3 drinks/day or >7 drinks/week? No   Prescreen: >3 drinks/day or >7 drinks/week? -   No flowsheet data found.    Any new diagnosis of family breast, ovarian, or bowel cancer? No     Reviewed orders with patient.  Reviewed health maintenance and updated orders accordingly - Yes         Pertinent mammograms are reviewed under the imaging tab.    History of abnormal Pap smear: NO - age 30-65 PAP every 5 years with negative HPV co-testing recommended  PAP / HPV Latest Ref Rng & Units 1/2/2019 11/2/2016 10/2/2015   PAP - NIL OTHER-NIL, See Result OTHER-NIL, See Result   HPV 16 DNA NEG:Negative Negative Negative -   HPV 18 DNA NEG:Negative Negative Negative -   OTHER HR HPV NEG:Negative Negative Negative -     Reviewed and updated as needed this visit by clinical staff                 Reviewed and updated as needed this visit by Provider                    Review of Systems   Constitutional:  "Negative.    HENT: Negative.    Eyes: Negative.    Respiratory: Negative.    Cardiovascular: Negative.    Gastrointestinal: Negative.    Endocrine: Negative.    Breasts:  Negative for breast mass and discharge.   Genitourinary: Positive for frequency and vaginal discharge. Negative for pelvic pain and vaginal bleeding.   Neurological: Negative.    Psychiatric/Behavioral: Negative.          OBJECTIVE:   /62   Pulse 101   Temp 98.4  F (36.9  C) (Oral)   Resp 13   Ht 1.575 m (5' 2\")   Wt 42.8 kg (94 lb 6.4 oz)   LMP 02/10/2021   SpO2 100%   BMI 17.27 kg/m    Physical Exam  GENERAL: healthy, alert and no distress  EYES: Eyes grossly normal to inspection, PERRL and conjunctivae and sclerae normal  HENT: ear canals and TM's normal, nose and mouth without ulcers or lesions  NECK: no adenopathy, no asymmetry, masses, or scars and thyroid normal to palpation  RESP: lungs clear to auscultation - no rales, rhonchi or wheezes  BREAST: normal without masses, tenderness or nipple discharge and no palpable axillary masses or adenopathy  CV: regular rate and rhythm, normal S1 S2, no S3 or S4, no murmur, click or rub, no peripheral edema and peripheral pulses strong  ABDOMEN: soft, nontender, no hepatosplenomegaly, no masses and bowel sounds normal   (female): normal female external genitalia, normal urethral meatus, vaginal mucosa pink, moist, well rugated, and normal cervix/adnexa  without masses .  White discharge noted, wet prep done  MS: no gross musculoskeletal defects noted, no edema  NEURO: Normal strength and tone, mentation intact and speech normal  PSYCH: mentation appears normal, affect normal/bright    Diagnostic Test Results:  Labs reviewed in Epic    ASSESSMENT/PLAN:       (Z00.00) Routine history and physical examination of adult  (primary encounter diagnosis)  Plan: Hemoglobin, Comprehensive metabolic panel,         Lipid panel reflex to direct LDL Fasting        Discussed birth control pills and the " "duration and advised that we need to discontinue, currently not on any contraception other than birth control pills.  Patient will think about it.       (E78.2) Mixed hyperlipidemia  Plan: Not on medications, continue diet and exercise    (N89.8) Vaginal discharge  Plan: Wet prep            (R30.0) Dysuria  Plan: UA with Microscopic reflex to Culture              Patient has been advised of split billing requirements and indicates understanding: Yes  COUNSELING:  Reviewed preventive health counseling, as reflected in patient instructions       Regular exercise       Healthy diet/nutrition    Estimated body mass index is 17.27 kg/m  as calculated from the following:    Height as of this encounter: 1.575 m (5' 2\").    Weight as of this encounter: 42.8 kg (94 lb 6.4 oz).        She reports that she has never smoked. She has never used smokeless tobacco.      Counseling Resources:  ATP IV Guidelines  Pooled Cohorts Equation Calculator  Breast Cancer Risk Calculator  BRCA-Related Cancer Risk Assessment: FHS-7 Tool  FRAX Risk Assessment  ICSI Preventive Guidelines  Dietary Guidelines for Americans, 2010  USDA's MyPlate  ASA Prophylaxis  Lung CA Screening    Deena Silvestre MD  [facility]  "

## 2021-03-03 LAB
ALBUMIN SERPL-MCNC: 3.9 G/DL (ref 3.4–5)
ALP SERPL-CCNC: 31 U/L (ref 40–150)
ALT SERPL W P-5'-P-CCNC: 15 U/L (ref 0–50)
ANION GAP SERPL CALCULATED.3IONS-SCNC: 8 MMOL/L (ref 3–14)
AST SERPL W P-5'-P-CCNC: 14 U/L (ref 0–45)
BILIRUB SERPL-MCNC: 0.6 MG/DL (ref 0.2–1.3)
BUN SERPL-MCNC: 12 MG/DL (ref 7–30)
CALCIUM SERPL-MCNC: 9.2 MG/DL (ref 8.5–10.1)
CHLORIDE SERPL-SCNC: 108 MMOL/L (ref 94–109)
CHOLEST SERPL-MCNC: 206 MG/DL
CO2 SERPL-SCNC: 22 MMOL/L (ref 20–32)
CREAT SERPL-MCNC: 0.88 MG/DL (ref 0.52–1.04)
GFR SERPL CREATININE-BSD FRML MDRD: 77 ML/MIN/{1.73_M2}
GLUCOSE SERPL-MCNC: 81 MG/DL (ref 70–99)
HDLC SERPL-MCNC: 59 MG/DL
LDLC SERPL CALC-MCNC: 111 MG/DL
NONHDLC SERPL-MCNC: 147 MG/DL
POTASSIUM SERPL-SCNC: 3.7 MMOL/L (ref 3.4–5.3)
PROT SERPL-MCNC: 8.1 G/DL (ref 6.8–8.8)
SODIUM SERPL-SCNC: 138 MMOL/L (ref 133–144)
TRIGL SERPL-MCNC: 182 MG/DL

## 2021-03-08 DIAGNOSIS — N83.202 CYSTS OF BOTH OVARIES: ICD-10-CM

## 2021-03-08 DIAGNOSIS — N83.201 CYSTS OF BOTH OVARIES: ICD-10-CM

## 2021-03-10 RX ORDER — NORETHINDRONE ACETATE AND ETHINYL ESTRADIOL .02; 1 MG/1; MG/1
TABLET ORAL
Qty: 84 TABLET | Refills: 3 | Status: SHIPPED | OUTPATIENT
Start: 2021-03-10 | End: 2022-02-10

## 2021-03-10 NOTE — TELEPHONE ENCOUNTER
Pending Prescriptions:                       Disp   Refills    norethindrone-ethinyl estradiol (JUNEL 1/*84 tab*3            Sig: TAKE 1 TABLET BY MOUTH DAILY AS DIRECTED.    Prescription approved per [facility] Refill Protocol.

## 2021-05-25 DIAGNOSIS — Z00.00 ROUTINE HISTORY AND PHYSICAL EXAMINATION OF ADULT: ICD-10-CM

## 2021-05-25 PROCEDURE — 86481 TB AG RESPONSE T-CELL SUSP: CPT | Performed by: INTERNAL MEDICINE

## 2021-05-25 PROCEDURE — 36415 COLL VENOUS BLD VENIPUNCTURE: CPT | Performed by: INTERNAL MEDICINE

## 2021-05-26 LAB
GAMMA INTERFERON BACKGROUND BLD IA-ACNC: 0.51 IU/ML
M TB IFN-G CD4+ BCKGRND COR BLD-ACNC: 9.5 IU/ML
M TB TUBERC IFN-G BLD QL: POSITIVE
MITOGEN IGNF BCKGRD COR BLD-ACNC: 0.35 IU/ML
MITOGEN IGNF BCKGRD COR BLD-ACNC: 0.53 IU/ML

## 2021-05-27 ENCOUNTER — TELEPHONE (OUTPATIENT)
Dept: INTERNAL MEDICINE | Facility: CLINIC | Age: 51
End: 2021-05-27

## 2021-05-27 DIAGNOSIS — R76.12 POSITIVE QUANTIFERON-TB GOLD TEST: Primary | ICD-10-CM

## 2021-05-27 NOTE — TELEPHONE ENCOUNTER
Patient calls to ask what next steps are for a Positive MTB Quantiferon Result result?  Patient also states she needs results to her employer before June 06.  Please advise.

## 2021-05-28 ENCOUNTER — TELEPHONE (OUTPATIENT)
Dept: INTERNAL MEDICINE | Facility: CLINIC | Age: 51
End: 2021-05-28

## 2021-05-28 ENCOUNTER — HOSPITAL ENCOUNTER (OUTPATIENT)
Dept: GENERAL RADIOLOGY | Facility: CLINIC | Age: 51
Discharge: HOME | End: 2021-05-28
Attending: INTERNAL MEDICINE | Admitting: INTERNAL MEDICINE
Payer: COMMERCIAL

## 2021-05-28 DIAGNOSIS — R76.12 POSITIVE QUANTIFERON-TB GOLD TEST: ICD-10-CM

## 2021-05-28 DIAGNOSIS — R76.12 POSITIVE QUANTIFERON-TB GOLD TEST: Primary | ICD-10-CM

## 2021-05-28 PROCEDURE — 71046 X-RAY EXAM CHEST 2 VIEWS: CPT

## 2021-05-28 NOTE — TELEPHONE ENCOUNTER
I have put a referral for infectious disease, please advise patient to call and make an appointment   [facility] - Kim [phone]

## 2021-05-28 NOTE — TELEPHONE ENCOUNTER
Call received from patient requesting recommendations for next steps for positive Quantiferon Gold test. Please advise.

## 2021-05-28 NOTE — TELEPHONE ENCOUNTER
I have put a referral for infectious disease, please advise patient to call and make an appointment   [facility] - Kim [phone]

## 2021-07-13 ENCOUNTER — TELEPHONE (OUTPATIENT)
Dept: INTERNAL MEDICINE | Facility: CLINIC | Age: 51
End: 2021-07-13

## 2021-07-16 ENCOUNTER — MEDICAL CORRESPONDENCE (OUTPATIENT)
Dept: HEALTH INFORMATION MANAGEMENT | Facility: CLINIC | Age: 51
End: 2021-07-16

## 2021-07-16 DIAGNOSIS — A15.9 TUBERCULOSIS: Primary | ICD-10-CM

## 2021-07-19 ENCOUNTER — LAB (OUTPATIENT)
Dept: LAB | Facility: CLINIC | Age: 51
End: 2021-07-19
Payer: COMMERCIAL

## 2021-07-19 DIAGNOSIS — A15.9 TUBERCULOSIS: ICD-10-CM

## 2021-07-19 PROCEDURE — 80076 HEPATIC FUNCTION PANEL: CPT

## 2021-07-19 PROCEDURE — 36415 COLL VENOUS BLD VENIPUNCTURE: CPT

## 2021-07-20 LAB
ALBUMIN SERPL-MCNC: 3.9 G/DL (ref 3.4–5)
ALP SERPL-CCNC: 34 U/L (ref 40–150)
ALT SERPL W P-5'-P-CCNC: 19 U/L (ref 0–50)
AST SERPL W P-5'-P-CCNC: 16 U/L (ref 0–45)
BILIRUB DIRECT SERPL-MCNC: 0.1 MG/DL (ref 0–0.2)
BILIRUB SERPL-MCNC: 0.5 MG/DL (ref 0.2–1.3)
PROT SERPL-MCNC: 7.6 G/DL (ref 6.8–8.8)

## 2021-08-04 DIAGNOSIS — A15.9 TUBERCULOSIS: Primary | ICD-10-CM

## 2021-08-26 ENCOUNTER — LAB (OUTPATIENT)
Dept: LAB | Facility: CLINIC | Age: 51
End: 2021-08-26
Payer: COMMERCIAL

## 2021-08-26 DIAGNOSIS — A15.9 TUBERCULOSIS: ICD-10-CM

## 2021-08-26 PROCEDURE — 80076 HEPATIC FUNCTION PANEL: CPT

## 2021-08-26 PROCEDURE — 36415 COLL VENOUS BLD VENIPUNCTURE: CPT

## 2021-08-27 LAB
ALBUMIN SERPL-MCNC: 3.9 G/DL (ref 3.4–5)
ALP SERPL-CCNC: 32 U/L (ref 40–150)
ALT SERPL W P-5'-P-CCNC: 24 U/L (ref 0–50)
AST SERPL W P-5'-P-CCNC: 20 U/L (ref 0–45)
BILIRUB DIRECT SERPL-MCNC: 0.1 MG/DL (ref 0–0.2)
BILIRUB SERPL-MCNC: 0.5 MG/DL (ref 0.2–1.3)
PROT SERPL-MCNC: 7.6 G/DL (ref 6.8–8.8)

## 2021-09-05 ENCOUNTER — HEALTH MAINTENANCE LETTER (OUTPATIENT)
Age: 51
End: 2021-09-05

## 2021-09-22 ENCOUNTER — TRANSFERRED RECORDS (OUTPATIENT)
Dept: HEALTH INFORMATION MANAGEMENT | Facility: CLINIC | Age: 51
End: 2021-09-22

## 2021-09-30 ENCOUNTER — DOCUMENTATION ONLY (OUTPATIENT)
Dept: LAB | Facility: CLINIC | Age: 51
End: 2021-09-30

## 2021-10-01 ENCOUNTER — MEDICAL CORRESPONDENCE (OUTPATIENT)
Dept: HEALTH INFORMATION MANAGEMENT | Facility: CLINIC | Age: 51
End: 2021-10-01

## 2021-10-07 ENCOUNTER — LAB (OUTPATIENT)
Dept: LAB | Facility: CLINIC | Age: 51
End: 2021-10-07
Payer: COMMERCIAL

## 2021-10-07 DIAGNOSIS — Z22.7 LATENT TUBERCULOSIS: ICD-10-CM

## 2021-10-07 PROCEDURE — 80076 HEPATIC FUNCTION PANEL: CPT

## 2021-10-07 PROCEDURE — 36415 COLL VENOUS BLD VENIPUNCTURE: CPT

## 2021-10-08 LAB
ALBUMIN SERPL-MCNC: 3.4 G/DL (ref 3.4–5)
ALP SERPL-CCNC: 27 U/L (ref 40–150)
ALT SERPL W P-5'-P-CCNC: 20 U/L (ref 0–50)
AST SERPL W P-5'-P-CCNC: 17 U/L (ref 0–45)
BILIRUB DIRECT SERPL-MCNC: 0.1 MG/DL (ref 0–0.2)
BILIRUB SERPL-MCNC: 0.6 MG/DL (ref 0.2–1.3)
PROT SERPL-MCNC: 7.3 G/DL (ref 6.8–8.8)

## 2021-11-18 ENCOUNTER — LAB (OUTPATIENT)
Dept: LAB | Facility: CLINIC | Age: 51
End: 2021-11-18
Payer: COMMERCIAL

## 2021-11-18 DIAGNOSIS — Z22.7 LATENT TUBERCULOSIS: ICD-10-CM

## 2021-11-18 PROCEDURE — 80076 HEPATIC FUNCTION PANEL: CPT

## 2021-11-18 PROCEDURE — 36415 COLL VENOUS BLD VENIPUNCTURE: CPT

## 2021-11-19 LAB
ALBUMIN SERPL-MCNC: 3.4 G/DL (ref 3.4–5)
ALP SERPL-CCNC: 31 U/L (ref 40–150)
ALT SERPL W P-5'-P-CCNC: 23 U/L (ref 0–50)
AST SERPL W P-5'-P-CCNC: 14 U/L (ref 0–45)
BILIRUB DIRECT SERPL-MCNC: 0.1 MG/DL (ref 0–0.2)
BILIRUB SERPL-MCNC: 0.5 MG/DL (ref 0.2–1.3)
PROT SERPL-MCNC: 7.4 G/DL (ref 6.8–8.8)

## 2021-12-22 ENCOUNTER — ANCILLARY PROCEDURE (OUTPATIENT)
Dept: GENERAL RADIOLOGY | Facility: CLINIC | Age: 51
End: 2021-12-22
Attending: FAMILY MEDICINE
Payer: COMMERCIAL

## 2021-12-22 ENCOUNTER — OFFICE VISIT (OUTPATIENT)
Dept: INTERNAL MEDICINE | Facility: CLINIC | Age: 51
End: 2021-12-22
Payer: COMMERCIAL

## 2021-12-22 VITALS
WEIGHT: 94 LBS | RESPIRATION RATE: 14 BRPM | BODY MASS INDEX: 17.3 KG/M2 | DIASTOLIC BLOOD PRESSURE: 62 MMHG | SYSTOLIC BLOOD PRESSURE: 100 MMHG | OXYGEN SATURATION: 100 % | HEIGHT: 62 IN | HEART RATE: 98 BPM | TEMPERATURE: 97.8 F

## 2021-12-22 DIAGNOSIS — M79.644 FINGER PAIN, RIGHT: Primary | ICD-10-CM

## 2021-12-22 DIAGNOSIS — M79.644 FINGER PAIN, RIGHT: ICD-10-CM

## 2021-12-22 PROCEDURE — 99213 OFFICE O/P EST LOW 20 MIN: CPT | Performed by: FAMILY MEDICINE

## 2021-12-22 PROCEDURE — 73140 X-RAY EXAM OF FINGER(S): CPT | Mod: RT | Performed by: RADIOLOGY

## 2021-12-22 RX ORDER — PYRIDOXINE HCL (VITAMIN B6) 50 MG
TABLET ORAL
COMMUNITY
Start: 2021-06-04 | End: 2022-07-13

## 2021-12-22 RX ORDER — ISONIAZID 300 MG/1
TABLET ORAL
COMMUNITY
Start: 2021-06-04 | End: 2022-07-13

## 2021-12-22 ASSESSMENT — PAIN SCALES - GENERAL: PAINLEVEL: MODERATE PAIN (4)

## 2021-12-22 ASSESSMENT — MIFFLIN-ST. JEOR: SCORE: 986.69

## 2021-12-22 NOTE — PROGRESS NOTES
"  (M79.134) Finger pain, right  (primary encounter diagnosis)  Comment:   Somewhat unclear what is causing her presentation.  Consider a tendon rupture or injury.  Tenosynovitis would be a consideration.  In general seems to be improving a bit.    Plan: XR Finger Right G/E 2 Views, Orthopedic          Referral        I have suggested an opinion from a hand specialist and she is agreeable with this plan.        Laney Irwin is a 51 year old who presents for the following health issues : right index finger pain since December 10, 2021. Denies injury.    HPI     51-year-old woman presents with pain in the proximal portion of the right index finger.  This became acutely tender and swollen on December 10.  There was no apparent injury.  Tenderness was located in the area of the PIP joint and just proximal to this.  This pain and swelling remained for a over a week.  She says she was unable to flex the right index very much and tell lately.  She did not take any medication or do any treatments.  In the last few days things seem to have subsided somewhat with less swelling but some remaining tenderness.    Patient has no previous history of joint pains.    She works as a [place] .  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    She is right-handed.        Review of Systems     Unremarkable except as above.        Objective    /62   Pulse 98   Temp 97.8  F (36.6  C) (Tympanic)   Resp 14   Ht 1.562 m (5' 1.5\")   Wt 42.6 kg (94 lb)   LMP 12/13/2021 (Approximate)   SpO2 100%   Breastfeeding No   BMI 17.47 kg/m    Body mass index is 17.47 kg/m .  Physical Exam     Thin woman who appears well in general.  Right hand -   Tenderness to palpation in the distal portion of the proximal phalanx of right index especially the ulnar aspect.  Minimal swelling.  Range of motion of right index is basically normal.    X-ray -no obvious bony " abnormality although I have a question about the PIP joint.

## 2021-12-30 ENCOUNTER — LAB (OUTPATIENT)
Dept: LAB | Facility: CLINIC | Age: 51
End: 2021-12-30
Payer: COMMERCIAL

## 2021-12-30 DIAGNOSIS — Z22.7 LATENT TUBERCULOSIS: ICD-10-CM

## 2021-12-30 PROCEDURE — 80076 HEPATIC FUNCTION PANEL: CPT

## 2021-12-30 PROCEDURE — 36415 COLL VENOUS BLD VENIPUNCTURE: CPT

## 2021-12-31 LAB
ALBUMIN SERPL-MCNC: 3.5 G/DL (ref 3.4–5)
ALP SERPL-CCNC: 27 U/L (ref 40–150)
ALT SERPL W P-5'-P-CCNC: 20 U/L (ref 0–50)
AST SERPL W P-5'-P-CCNC: 16 U/L (ref 0–45)
BILIRUB DIRECT SERPL-MCNC: 0.1 MG/DL (ref 0–0.2)
BILIRUB SERPL-MCNC: 0.5 MG/DL (ref 0.2–1.3)
PROT SERPL-MCNC: 7.1 G/DL (ref 6.8–8.8)

## 2022-01-03 ENCOUNTER — TRANSFERRED RECORDS (OUTPATIENT)
Dept: HEALTH INFORMATION MANAGEMENT | Facility: CLINIC | Age: 52
End: 2022-01-03
Payer: COMMERCIAL

## 2022-01-11 ENCOUNTER — LAB (OUTPATIENT)
Dept: LAB | Facility: CLINIC | Age: 52
End: 2022-01-11
Payer: COMMERCIAL

## 2022-01-11 DIAGNOSIS — Z00.00 HEALTH MAINTENANCE EXAMINATION: Primary | ICD-10-CM

## 2022-01-11 PROCEDURE — 84550 ASSAY OF BLOOD/URIC ACID: CPT

## 2022-01-11 PROCEDURE — 36415 COLL VENOUS BLD VENIPUNCTURE: CPT

## 2022-01-12 LAB — URATE SERPL-MCNC: 4.5 MG/DL (ref 2.6–6)

## 2022-02-09 DIAGNOSIS — N83.202 CYSTS OF BOTH OVARIES: ICD-10-CM

## 2022-02-09 DIAGNOSIS — N83.201 CYSTS OF BOTH OVARIES: ICD-10-CM

## 2022-02-10 RX ORDER — NORETHINDRONE ACETATE AND ETHINYL ESTRADIOL .02; 1 MG/1; MG/1
TABLET ORAL
Qty: 84 TABLET | Refills: 2 | Status: SHIPPED | OUTPATIENT
Start: 2022-02-10 | End: 2022-11-03

## 2022-02-11 ENCOUNTER — HOSPITAL ENCOUNTER (OUTPATIENT)
Dept: MAMMOGRAPHY | Facility: CLINIC | Age: 52
Discharge: HOME | End: 2022-02-11
Attending: INTERNAL MEDICINE | Admitting: INTERNAL MEDICINE
Payer: COMMERCIAL

## 2022-02-11 ENCOUNTER — LAB (OUTPATIENT)
Dept: LAB | Facility: CLINIC | Age: 52
End: 2022-02-11
Payer: COMMERCIAL

## 2022-02-11 DIAGNOSIS — Z22.7 LATENT TUBERCULOSIS: ICD-10-CM

## 2022-02-11 DIAGNOSIS — Z12.31 VISIT FOR SCREENING MAMMOGRAM: ICD-10-CM

## 2022-02-11 PROCEDURE — 80076 HEPATIC FUNCTION PANEL: CPT

## 2022-02-11 PROCEDURE — 36415 COLL VENOUS BLD VENIPUNCTURE: CPT

## 2022-02-11 PROCEDURE — 77067 SCR MAMMO BI INCL CAD: CPT

## 2022-02-13 LAB
ALBUMIN SERPL-MCNC: 3.8 G/DL (ref 3.4–5)
ALP SERPL-CCNC: 35 U/L (ref 40–150)
ALT SERPL W P-5'-P-CCNC: 23 U/L (ref 0–50)
AST SERPL W P-5'-P-CCNC: 20 U/L (ref 0–45)
BILIRUB DIRECT SERPL-MCNC: 0.1 MG/DL (ref 0–0.2)
BILIRUB SERPL-MCNC: 0.5 MG/DL (ref 0.2–1.3)
PROT SERPL-MCNC: 7.6 G/DL (ref 6.8–8.8)

## 2022-03-10 ASSESSMENT — ENCOUNTER SYMPTOMS
WEAKNESS: 0
SHORTNESS OF BREATH: 0
HEARTBURN: 0
FEVER: 0
HEADACHES: 0
HEMATURIA: 0
NAUSEA: 0
CONSTIPATION: 0
ABDOMINAL PAIN: 0
DIARRHEA: 0
SORE THROAT: 0
COUGH: 0
PARESTHESIAS: 0
JOINT SWELLING: 0
PALPITATIONS: 0
ARTHRALGIAS: 0
DYSURIA: 0
EYE PAIN: 0
MYALGIAS: 0
HEMATOCHEZIA: 0
BREAST MASS: 0
CHILLS: 0
DIZZINESS: 0
FREQUENCY: 0
NERVOUS/ANXIOUS: 0

## 2022-03-14 ENCOUNTER — OFFICE VISIT (OUTPATIENT)
Dept: INTERNAL MEDICINE | Facility: CLINIC | Age: 52
End: 2022-03-14
Payer: COMMERCIAL

## 2022-03-14 VITALS
TEMPERATURE: 97.3 F | RESPIRATION RATE: 14 BRPM | WEIGHT: 94.1 LBS | DIASTOLIC BLOOD PRESSURE: 69 MMHG | BODY MASS INDEX: 17.32 KG/M2 | HEART RATE: 91 BPM | SYSTOLIC BLOOD PRESSURE: 108 MMHG | HEIGHT: 62 IN | OXYGEN SATURATION: 99 %

## 2022-03-14 DIAGNOSIS — N76.0 BV (BACTERIAL VAGINOSIS): ICD-10-CM

## 2022-03-14 DIAGNOSIS — N89.8 VAGINAL DISCHARGE: ICD-10-CM

## 2022-03-14 DIAGNOSIS — Z22.7 LATENT TUBERCULOSIS: ICD-10-CM

## 2022-03-14 DIAGNOSIS — B96.89 BV (BACTERIAL VAGINOSIS): ICD-10-CM

## 2022-03-14 DIAGNOSIS — Z00.00 ROUTINE HISTORY AND PHYSICAL EXAMINATION OF ADULT: Primary | ICD-10-CM

## 2022-03-14 LAB
ANION GAP SERPL CALCULATED.3IONS-SCNC: 10 MMOL/L (ref 3–14)
BUN SERPL-MCNC: 8 MG/DL (ref 7–30)
CALCIUM SERPL-MCNC: 8.9 MG/DL (ref 8.5–10.1)
CHLORIDE BLD-SCNC: 107 MMOL/L (ref 94–109)
CHOLEST SERPL-MCNC: 206 MG/DL
CLUE CELLS: PRESENT
CO2 SERPL-SCNC: 24 MMOL/L (ref 20–32)
CREAT SERPL-MCNC: 0.82 MG/DL (ref 0.52–1.04)
ERYTHROCYTE [DISTWIDTH] IN BLOOD BY AUTOMATED COUNT: 12.5 % (ref 10–15)
FASTING STATUS PATIENT QL REPORTED: YES
GFR SERPL CREATININE-BSD FRML MDRD: 86 ML/MIN/1.73M2
GLUCOSE BLD-MCNC: 85 MG/DL (ref 70–99)
HCT VFR BLD AUTO: 40 % (ref 35–47)
HDLC SERPL-MCNC: 64 MG/DL
HGB BLD-MCNC: 12.8 G/DL (ref 11.7–15.7)
LDLC SERPL CALC-MCNC: 110 MG/DL
MCH RBC QN AUTO: 30.5 PG (ref 26.5–33)
MCHC RBC AUTO-ENTMCNC: 32 G/DL (ref 31.5–36.5)
MCV RBC AUTO: 96 FL (ref 78–100)
NONHDLC SERPL-MCNC: 142 MG/DL
PLATELET # BLD AUTO: 293 10E3/UL (ref 150–450)
POTASSIUM BLD-SCNC: 3.8 MMOL/L (ref 3.4–5.3)
RBC # BLD AUTO: 4.19 10E6/UL (ref 3.8–5.2)
SODIUM SERPL-SCNC: 141 MMOL/L (ref 133–144)
TRICHOMONAS, WET PREP: ABNORMAL
TRIGL SERPL-MCNC: 159 MG/DL
TSH SERPL DL<=0.005 MIU/L-ACNC: 2.54 MU/L (ref 0.4–4)
WBC # BLD AUTO: 4.2 10E3/UL (ref 4–11)
WBC'S/HIGH POWER FIELD, WET PREP: ABNORMAL
YEAST, WET PREP: ABNORMAL

## 2022-03-14 PROCEDURE — 80048 BASIC METABOLIC PNL TOTAL CA: CPT | Performed by: INTERNAL MEDICINE

## 2022-03-14 PROCEDURE — 87210 SMEAR WET MOUNT SALINE/INK: CPT | Performed by: INTERNAL MEDICINE

## 2022-03-14 PROCEDURE — 90715 TDAP VACCINE 7 YRS/> IM: CPT | Performed by: INTERNAL MEDICINE

## 2022-03-14 PROCEDURE — 85027 COMPLETE CBC AUTOMATED: CPT | Performed by: INTERNAL MEDICINE

## 2022-03-14 PROCEDURE — 80061 LIPID PANEL: CPT | Performed by: INTERNAL MEDICINE

## 2022-03-14 PROCEDURE — 99396 PREV VISIT EST AGE 40-64: CPT | Mod: 25 | Performed by: INTERNAL MEDICINE

## 2022-03-14 PROCEDURE — 90471 IMMUNIZATION ADMIN: CPT | Performed by: INTERNAL MEDICINE

## 2022-03-14 PROCEDURE — 99213 OFFICE O/P EST LOW 20 MIN: CPT | Mod: 25 | Performed by: INTERNAL MEDICINE

## 2022-03-14 PROCEDURE — 36415 COLL VENOUS BLD VENIPUNCTURE: CPT | Performed by: INTERNAL MEDICINE

## 2022-03-14 PROCEDURE — 84443 ASSAY THYROID STIM HORMONE: CPT | Performed by: INTERNAL MEDICINE

## 2022-03-14 RX ORDER — NORETHINDRONE ACETATE AND ETHINYL ESTRADIOL AND FERROUS FUMARATE 5-7-9-7
1 KIT ORAL
COMMUNITY
End: 2022-03-14

## 2022-03-14 RX ORDER — METRONIDAZOLE 500 MG/1
500 TABLET ORAL 2 TIMES DAILY
Qty: 14 TABLET | Refills: 0 | Status: SHIPPED | OUTPATIENT
Start: 2022-03-14 | End: 2022-03-21

## 2022-03-14 ASSESSMENT — ENCOUNTER SYMPTOMS
MYALGIAS: 0
CONSTIPATION: 0
HEMATOCHEZIA: 0
ARTHRALGIAS: 0
NAUSEA: 0
WEAKNESS: 0
PARESTHESIAS: 0
EYE PAIN: 0
HEMATURIA: 0
DIARRHEA: 0
NERVOUS/ANXIOUS: 0
FEVER: 0
COUGH: 0
SHORTNESS OF BREATH: 0
SORE THROAT: 0
JOINT SWELLING: 0
HEADACHES: 0
PALPITATIONS: 0
ABDOMINAL PAIN: 0
HEARTBURN: 0
DYSURIA: 0
FREQUENCY: 0
BREAST MASS: 0
CHILLS: 0
DIZZINESS: 0

## 2022-03-14 NOTE — PROGRESS NOTES
SUBJECTIVE:   CC: Alida Pérez is an 51 year old woman who presents for preventive health visit.       Patient has been advised of split billing requirements and indicates understanding: Yes  Healthy Habits:     Getting at least 3 servings of Calcium per day:  Yes    Bi-annual eye exam:  Yes    Dental care twice a year:  Yes    Sleep apnea or symptoms of sleep apnea:  None    Diet:  Regular (no restrictions)    Frequency of exercise:  None    Taking medications regularly:  Yes    Medication side effects:  Not applicable    PHQ-2 Total Score: 0    Additional concerns today:  Yes        Hyperlipidemia Follow-Up      Are you regularly taking any medication or supplement to lower your cholesterol?   No    Are you having muscle aches or other side effects that you think could be caused by your cholesterol lowering medication?  NA    Patient complains of having a vaginal discharge since a few weeks, no itching, no odor.    History of latent tuberculosis currently on INH and pyridoxine for 9 months through infectious disease specialist      Today's PHQ-2 Score:   PHQ-2 ( 1999 Pfizer) 3/10/2022   Q1: Little interest or pleasure in doing things 0   Q2: Feeling down, depressed or hopeless 0   PHQ-2 Score 0   PHQ-2 Total Score (12-17 Years)- Positive if 3 or more points; Administer PHQ-A if positive -   Q1: Little interest or pleasure in doing things Not at all   Q2: Feeling down, depressed or hopeless Not at all   PHQ-2 Score 0       Abuse: Current or Past (Physical, Sexual or Emotional) - No  Do you feel safe in your environment? Yes    Past Medical History:   Diagnosis Date     NO ACTIVE PROBLEMS      Patient Active Problem List   Diagnosis     Screening examination for pulmonary tuberculosis     Screening for thyroid disorder     Hyperlipidemia     Ovarian cyst     HYPERLIPIDEMIA LDL GOAL <160     Vitamin D deficiency     Latent tuberculosis       Past Surgical History:   Procedure Laterality Date     AS ENLARGE BREAST  WITH IMPLANT  2014     EXCISE GANGLION WRIST  2009    right hand     MOUTH SURGERY      4 wisdom teeth/plus 4 teeth       Current Outpatient Medications   Medication Sig Dispense Refill     CALCIUM + D 600-200 MG-UNIT PO TABS 500 mg tablet once daily 200 Tab 3     isoniazid (NYDRAZID) 300 MG tablet        metroNIDAZOLE (FLAGYL) 500 MG tablet Take 1 tablet (500 mg) by mouth 2 times daily for 7 days Do not consume alcohol while on this medication 14 tablet 0     norethindrone-ethinyl estradiol (JUNEL 1/20) 1-20 MG-MCG tablet TAKE 1 TABLET BY MOUTH EVERY DAY AS DIRECTED 84 tablet 2     pyridOXINE (VITAMIN B-6) 50 MG tablet        tretinoin 0.05 % EX external cream Apply topically At Bedtime         Family History   Problem Relation Age of Onset     Allergies Mother         and Asthma     Respiratory Mother      Lipids Father      Cancer Maternal Grandmother         cervical     Cerebrovascular Disease Paternal Grandfather          Social History     Tobacco Use     Smoking status: Never Smoker     Smokeless tobacco: Never Used   Substance Use Topics     Alcohol use: No     If you drink alcohol do you typically have >3 drinks per day or >7 drinks per week? No    Alcohol Use 3/10/2022   Prescreen: >3 drinks/day or >7 drinks/week? Not Applicable   Prescreen: >3 drinks/day or >7 drinks/week? -   No flowsheet data found.    Reviewed orders with patient.  Reviewed health maintenance and updated orders accordingly - Yes  Lab work is in process    Breast Cancer Screening:    Breast CA Risk Assessment (FHS-7) 3/10/2022   Do you have a family history of breast, colon, or ovarian cancer? No / Unknown       Pertinent mammograms are reviewed under the imaging tab.    History of abnormal Pap smear: NO - age 30-65 PAP every 5 years with negative HPV co-testing recommended  PAP / HPV Latest Ref Rng & Units 1/2/2019 11/2/2016 10/2/2015   PAP (Historical) - NIL OTHER-NIL, See Result OTHER-NIL, See Result   HPV16 NEG:Negative Negative  "Negative -   HPV18 NEG:Negative Negative Negative -   HRHPV NEG:Negative Negative Negative -     Reviewed and updated as needed this visit by clinical staff   Tobacco     Med Hx  Surg Hx  Fam Hx  Soc Hx        Reviewed and updated as needed this visit by Provider                   Review of Systems   Constitutional: Negative for chills and fever.   HENT: Negative for congestion, ear pain, hearing loss and sore throat.    Eyes: Negative for pain and visual disturbance.   Respiratory: Negative for cough and shortness of breath.    Cardiovascular: Negative for chest pain, palpitations and peripheral edema.   Gastrointestinal: Negative for abdominal pain, constipation, diarrhea, heartburn, hematochezia and nausea.   Breasts:  Negative for tenderness, breast mass and discharge.   Genitourinary: Positive for vaginal discharge. Negative for dysuria, frequency, genital sores, hematuria, pelvic pain, urgency and vaginal bleeding.   Musculoskeletal: Negative for arthralgias, joint swelling and myalgias.   Skin: Negative for rash.   Neurological: Negative for dizziness, weakness, headaches and paresthesias.   Psychiatric/Behavioral: Negative for mood changes. The patient is not nervous/anxious.       OBJECTIVE:   /69   Pulse 91   Temp 97.3  F (36.3  C) (Axillary)   Resp 14   Ht 1.562 m (5' 1.5\")   Wt 42.7 kg (94 lb 1.6 oz)   LMP 03/07/2022   SpO2 99%   BMI 17.49 kg/m    Physical Exam  GENERAL APPEARANCE: healthy, alert and no distress  EYES: Eyes grossly normal to inspection, PERRL and conjunctivae and sclerae normal  NECK: no adenopathy, no asymmetry, masses, or scars and thyroid normal to palpation  RESP: lungs clear to auscultation - no rales, rhonchi or wheezes  BREAST: normal without masses, tenderness or nipple discharge and no palpable axillary masses or adenopathy  CV: regular rate and rhythm, normal S1 S2,    ABDOMEN: soft, nontender, no hepatosplenomegaly, no masses and bowel sounds normal   " "(female): normal female external genitalia, normal urethral meatus, vaginal mucosal atrophy noted, normal cervix, adnexae without masses thin white discharge noted, wet prep obtained  MS: no musculoskeletal defects are noted and gait is age appropriate without ataxia  NEURO: Normal strength and tone, sensory exam grossly normal, mentation intact and speech normal  PSYCH: mentation appears normal and affect normal/bright    Diagnostic Test Results:  Wet prep-clue cells noted    ASSESSMENT/PLAN:       (Z00.00) Routine history and physical examination of adult  (primary encounter diagnosis)  Plan: Lipid panel reflex to direct LDL Fasting, CBC         with platelets, TSH with free T4 reflex, Basic         metabolic panel  (Ca, Cl, CO2, Creat, Gluc, K,         Na, BUN)            (N89.8) Vaginal discharge  Plan: Wet prep - Clinic Collect            (Z22.7) Latent tuberculosis  Comment: on INH through ID for 9 monhts  Plan: Patient is requesting yearly TB test for work, future orders done for Quantiferon TB Gold Plus            (N76.0,  B96.89) BV (bacterial vaginosis)  Plan: Clue cells on wet prep, started on metroNIDAZOLE (FLAGYL) 500 MG tablet as directed.explained clearly about the medication,insructions and side effects.  Advised not to consume alcohol while on this medication            Patient has been advised of split billing requirements and indicates understanding: Yes    COUNSELING:  Reviewed preventive health counseling, as reflected in patient instructions       Regular exercise       Healthy diet/nutrition       Immunizations    Vaccinated for: TDAP          Estimated body mass index is 17.49 kg/m  as calculated from the following:    Height as of this encounter: 1.562 m (5' 1.5\").    Weight as of this encounter: 42.7 kg (94 lb 1.6 oz).        She reports that she has never smoked. She has never used smokeless tobacco.      Counseling Resources:  ATP IV Guidelines  Pooled Cohorts Equation Calculator  Breast " Cancer Risk Calculator  BRCA-Related Cancer Risk Assessment: FHS-7 Tool  FRAX Risk Assessment  ICSI Preventive Guidelines  Dietary Guidelines for Americans, 2010  USDA's MyPlate  ASA Prophylaxis  Lung CA Screening    Deena Silvestre MD  [facility]

## 2022-03-14 NOTE — NURSING NOTE
"/69   Pulse 91   Temp 97.3  F (36.3  C) (Axillary)   Resp 14   Ht 1.562 m (5' 1.5\")   Wt 42.7 kg (94 lb 1.6 oz)   LMP 03/07/2022   SpO2 99%   BMI 17.49 kg/m    Patient in for Female Px.  "

## 2022-03-25 ENCOUNTER — LAB (OUTPATIENT)
Dept: LAB | Facility: CLINIC | Age: 52
End: 2022-03-25
Payer: COMMERCIAL

## 2022-03-25 DIAGNOSIS — Z22.7 LATENT TUBERCULOSIS: ICD-10-CM

## 2022-03-25 PROCEDURE — 80076 HEPATIC FUNCTION PANEL: CPT

## 2022-03-25 PROCEDURE — 36415 COLL VENOUS BLD VENIPUNCTURE: CPT

## 2022-03-26 LAB
ALBUMIN SERPL-MCNC: 3.6 G/DL (ref 3.4–5)
ALP SERPL-CCNC: 29 U/L (ref 40–150)
ALT SERPL W P-5'-P-CCNC: 24 U/L (ref 0–50)
AST SERPL W P-5'-P-CCNC: 20 U/L (ref 0–45)
BILIRUB DIRECT SERPL-MCNC: 0.1 MG/DL (ref 0–0.2)
BILIRUB SERPL-MCNC: 0.4 MG/DL (ref 0.2–1.3)
PROT SERPL-MCNC: 7 G/DL (ref 6.8–8.8)

## 2022-05-09 ENCOUNTER — LAB (OUTPATIENT)
Dept: LAB | Facility: CLINIC | Age: 52
End: 2022-05-09
Payer: COMMERCIAL

## 2022-05-09 DIAGNOSIS — Z22.7 LATENT TUBERCULOSIS: ICD-10-CM

## 2022-05-09 PROCEDURE — 36415 COLL VENOUS BLD VENIPUNCTURE: CPT

## 2022-05-09 PROCEDURE — 80076 HEPATIC FUNCTION PANEL: CPT

## 2022-05-10 LAB
ALBUMIN SERPL-MCNC: 3.3 G/DL (ref 3.4–5)
ALP SERPL-CCNC: 44 U/L (ref 40–150)
ALT SERPL W P-5'-P-CCNC: 20 U/L (ref 0–50)
AST SERPL W P-5'-P-CCNC: 21 U/L (ref 0–45)
BILIRUB DIRECT SERPL-MCNC: 0.1 MG/DL (ref 0–0.2)
BILIRUB SERPL-MCNC: 0.5 MG/DL (ref 0.2–1.3)
PROT SERPL-MCNC: 7.2 G/DL (ref 6.8–8.8)

## 2022-05-25 ENCOUNTER — LAB (OUTPATIENT)
Dept: LAB | Facility: CLINIC | Age: 52
End: 2022-05-25
Payer: COMMERCIAL

## 2022-05-25 DIAGNOSIS — Z22.7 LATENT TUBERCULOSIS: ICD-10-CM

## 2022-05-25 PROCEDURE — 86481 TB AG RESPONSE T-CELL SUSP: CPT

## 2022-05-25 PROCEDURE — 36415 COLL VENOUS BLD VENIPUNCTURE: CPT

## 2022-05-26 LAB
QUANTIFERON MITOGEN: 10 IU/ML
QUANTIFERON NIL TUBE: 0.38 IU/ML
QUANTIFERON TB1 TUBE: 0.72 IU/ML
QUANTIFERON TB2 TUBE: 0.46

## 2022-05-27 LAB
GAMMA INTERFERON BACKGROUND BLD IA-ACNC: 0.38 IU/ML
M TB IFN-G BLD-IMP: NEGATIVE
M TB IFN-G CD4+ BCKGRND COR BLD-ACNC: 9.62 IU/ML
MITOGEN IGNF BCKGRD COR BLD-ACNC: 0.08 IU/ML
MITOGEN IGNF BCKGRD COR BLD-ACNC: 0.34 IU/ML

## 2022-06-27 ENCOUNTER — E-VISIT (OUTPATIENT)
Dept: INTERNAL MEDICINE | Facility: CLINIC | Age: 52
End: 2022-06-27
Payer: COMMERCIAL

## 2022-06-27 DIAGNOSIS — M79.601 PAIN OF RIGHT UPPER EXTREMITY: Primary | ICD-10-CM

## 2022-06-27 PROCEDURE — 99207 PR NO BILLABLE SERVICE THIS VISIT: CPT | Performed by: INTERNAL MEDICINE

## 2022-07-13 ENCOUNTER — OFFICE VISIT (OUTPATIENT)
Dept: FAMILY MEDICINE | Facility: CLINIC | Age: 52
End: 2022-07-13
Payer: COMMERCIAL

## 2022-07-13 VITALS
SYSTOLIC BLOOD PRESSURE: 112 MMHG | TEMPERATURE: 99.3 F | DIASTOLIC BLOOD PRESSURE: 76 MMHG | WEIGHT: 95.5 LBS | BODY MASS INDEX: 17.75 KG/M2 | RESPIRATION RATE: 16 BRPM | OXYGEN SATURATION: 99 % | HEART RATE: 86 BPM

## 2022-07-13 DIAGNOSIS — Z11.59 NEED FOR HEPATITIS B SCREENING TEST: ICD-10-CM

## 2022-07-13 DIAGNOSIS — M77.11 RIGHT LATERAL EPICONDYLITIS: Primary | ICD-10-CM

## 2022-07-13 DIAGNOSIS — L70.0 ACNE VULGARIS: ICD-10-CM

## 2022-07-13 PROCEDURE — 36415 COLL VENOUS BLD VENIPUNCTURE: CPT | Performed by: PHYSICIAN ASSISTANT

## 2022-07-13 PROCEDURE — 99213 OFFICE O/P EST LOW 20 MIN: CPT | Performed by: PHYSICIAN ASSISTANT

## 2022-07-13 PROCEDURE — 86706 HEP B SURFACE ANTIBODY: CPT | Performed by: PHYSICIAN ASSISTANT

## 2022-07-13 PROCEDURE — 87340 HEPATITIS B SURFACE AG IA: CPT | Performed by: PHYSICIAN ASSISTANT

## 2022-07-13 RX ORDER — TRETINOIN 0.5 MG/G
CREAM TOPICAL AT BEDTIME
Qty: 20 G | Refills: 0 | Status: SHIPPED | OUTPATIENT
Start: 2022-07-13 | End: 2022-12-23

## 2022-07-13 RX ORDER — TRETINOIN 0.5 MG/G
CREAM TOPICAL AT BEDTIME
Status: CANCELLED | OUTPATIENT
Start: 2022-07-13

## 2022-07-13 NOTE — PROGRESS NOTES
Assessment & Plan     Alida was seen today for arm pain.    Diagnoses and all orders for this visit:    Right lateral epicondylitis    - Advised trial of RICE and NSAIDs. Pt to trial R wrist brace, regular icing and ibuprofen over next 1-2 weeks. Risks/benefits reviewed. If no improvement, consider orthopedic consult.    Acne vulgaris  -     Pt requests script be filled by primary care due to cost affiliated with dermatology visit. Stable and works well for her. Renewed for her today.  - tretinoin (RETIN-A) 0.05 % external cream; Apply topically At Bedtime    Need for hepatitis B screening test  -     Pt requesting screening given brother-in-law's new diagnosis. No other risk factors or exposure concerns otherwise.  - Hepatitis B surface antigen; Future  -     Hepatitis B Surface Antibody; Future  -     Hepatitis B surface antigen  -     Hepatitis B Surface Antibody      Return in about 2 weeks (around 7/27/2022) for ortho consult if not improving .    ARTEM Mcintyre [facility]   Alida is a 51 year old accompanied by herself, presenting for the following health issues:  Arm Pain      History of Present Illness       Reason for visit:  Righ arm pain  Symptom onset:  More than a month  Symptoms include:  Intermittent pain  Symptom progression:  Worsening  Had these symptoms before:  No  What makes it worse:  Movements  What makes it better:  No    She eats 2-3 servings of fruits and vegetables daily.She consumes 1 sweetened beverage(s) daily.She exercises with enough effort to increase her heart rate 9 or less minutes per day.  She exercises with enough effort to increase her heart rate 3 or less days per week.   She is taking medications regularly.    Persistent 8/10 pain in her right lateral elbow that has been ongoing for 2 months. R-handed. Uses thuumb consistently, but denies any   Onset started at same time she had COVID - initially impacted both arms and legs.  Everywhere else got better excluding her R arm. 3 weeks ago pain worsened. Worse with flexion, extension, gripping, grasping, reaching, supination, pronation, and lifting makes pain worse. If just rests improves it, pain less severe. Always has dull pain though. Never resolves entirely.   No trauma or injury otherwise.  No fevers, redness, warmth.     Not therapies have been tried.  Tried wearing a wrist brace for 1 week at night only.   No hx of renal disease, GI ulcer or bleed.     Requesting to have hep B screening - recently found out that brother-in-law tested positive for hep B. Denies any known blood borne exposure, but would like to have checked.    Current Outpatient Medications   Medication     CALCIUM + D 600-200 MG-UNIT PO TABS     norethindrone-ethinyl estradiol (JUNEL 1/20) 1-20 MG-MCG tablet     tretinoin 0.05 % EX external cream     No current facility-administered medications for this visit.        Allergies   Allergen Reactions     No Known Drug Allergies        Review of Systems   Constitutional, HEENT, cardiovascular, pulmonary, gi and gu, skin, neuro, MSK systems are negative, except as otherwise noted.      Objective    /76 (BP Location: Right arm, Patient Position: Chair, Cuff Size: Adult Regular)   Pulse 86   Temp 99.3  F (37.4  C) (Oral)   Resp 16   Wt 43.3 kg (95 lb 8 oz)   SpO2 99%   BMI 17.75 kg/m    Body mass index is 17.75 kg/m .  Physical Exam   GENERAL: healthy, alert and no distress  EYES: Eyes grossly normal to inspection, PERRL and conjunctivae and sclerae normal  MS: can demonstrate FROM of R elbow without limitation, but endorses lateral epicondyle pain with flexion, wrist extension and supination/pronation of wrist. Pain incited with R wrist extension against resistance in area of same. TTP of lateral epicondyle and proximal tendon, musculature of forearm. No TTP of olecranon.   SKIN: no redness, swelling or crepitus noted to R lateral epicondyle. Pt points out  subtle papular acne along chin, but without significant erythema at this time.   NEURO: equal and symmetric  strength, but incites pain of R lateral epicondyle.  Normal strength and tone, sensory exam grossly normal and mentation intact    Reviewed previously negative Hep B surface antigen              .  ..

## 2022-07-14 LAB
HBV SURFACE AB SERPL IA-ACNC: 0 M[IU]/ML
HBV SURFACE AG SERPL QL IA: NONREACTIVE

## 2022-07-16 NOTE — RESULT ENCOUNTER NOTE
Dear Alida,      Your recent test results are noted below:    -Negative for Hepatitis B acute infection, but you are not immune. We could always consider repeating your hepatitis B vaccine series should you wish.    For additional lab test information, labtestsonline.org is an excellent reference. Please contact the clinic at [phone] with any further questions or concerns.    Sincerely,      Nydia Barrett PA-C  [facility]

## 2022-07-25 ENCOUNTER — TELEPHONE (OUTPATIENT)
Dept: FAMILY MEDICINE | Facility: CLINIC | Age: 52
End: 2022-07-25

## 2022-07-26 NOTE — TELEPHONE ENCOUNTER
PA Initiation    Medication: tretinoin (RETIN-A) 0.05 % external cream  Insurance Company: [employer] - Phone [phone] Fax [phone]  Pharmacy Filling the Rx: [id number] [place] [facility] [place] [facility] [address]  Filling Pharmacy Phone: [phone]  Filling Pharmacy Fax: [phone]  Start Date: 7/26/2022

## 2022-07-29 NOTE — TELEPHONE ENCOUNTER
Central Prior Authorization Team   Phone: [phone]    Prior Authorization Approval    Authorization Effective Date: 7/28/2022  Authorization Expiration Date: 7/28/2023  Medication: tretinoin (RETIN-A) 0.05 % external cream - APPROVED  Approved Dose/Quantity:    Reference #: [id number]   Insurance Company: [employer] - Phone [phone] Fax [phone]  Expected CoPay:       CoPay Card Available:      Foundation Assistance Needed:    Which Pharmacy is filling the prescription (Not needed for infusion/clinic administered): [facility] [id number] [place] [id number]  Pharmacy Notified: Yes  Patient Notified: Yes  **Instructed pharmacy to notify patient when script is ready to /ship.**

## 2022-09-19 ENCOUNTER — TRANSFERRED RECORDS (OUTPATIENT)
Dept: HEALTH INFORMATION MANAGEMENT | Facility: CLINIC | Age: 52
End: 2022-09-19

## 2022-10-23 ENCOUNTER — HEALTH MAINTENANCE LETTER (OUTPATIENT)
Age: 52
End: 2022-10-23

## 2022-11-02 DIAGNOSIS — N83.201 CYSTS OF BOTH OVARIES: ICD-10-CM

## 2022-11-02 DIAGNOSIS — N83.202 CYSTS OF BOTH OVARIES: ICD-10-CM

## 2022-11-03 RX ORDER — NORETHINDRONE ACETATE AND ETHINYL ESTRADIOL .02; 1 MG/1; MG/1
TABLET ORAL
Qty: 84 TABLET | Refills: 2 | Status: SHIPPED | OUTPATIENT
Start: 2022-11-03 | End: 2023-05-04

## 2022-11-03 NOTE — TELEPHONE ENCOUNTER
Prescription approved per [facility] Refill Protocol.     Alicia Santoyo RN  [facility]

## 2022-12-22 ENCOUNTER — MYC REFILL (OUTPATIENT)
Dept: FAMILY MEDICINE | Facility: CLINIC | Age: 52
End: 2022-12-22

## 2022-12-22 DIAGNOSIS — L70.0 ACNE VULGARIS: ICD-10-CM

## 2022-12-23 RX ORDER — TRETINOIN 0.5 MG/G
CREAM TOPICAL AT BEDTIME
Qty: 20 G | Refills: 0 | OUTPATIENT
Start: 2022-12-23

## 2023-01-27 ENCOUNTER — MYC MEDICAL ADVICE (OUTPATIENT)
Dept: INTERNAL MEDICINE | Facility: CLINIC | Age: 53
End: 2023-01-27
Payer: COMMERCIAL

## 2023-01-27 DIAGNOSIS — Z12.31 VISIT FOR SCREENING MAMMOGRAM: Primary | ICD-10-CM

## 2023-01-27 DIAGNOSIS — Z12.31 ENCOUNTER FOR SCREENING MAMMOGRAM FOR BREAST CANCER: ICD-10-CM

## 2023-01-27 NOTE — TELEPHONE ENCOUNTER
Pt is due for physical in 03/23. Orders done    Please advise pt that schedule gets booked out fast and it is difficult to add on patients when schedule is full.  So it is highly advised that she make appointment ahead of time so that we can follow up on labs/imaging studies in timely manner.    Please call patient with above information and help schedule if needed.

## 2023-02-11 DIAGNOSIS — L70.0 ACNE VULGARIS: ICD-10-CM

## 2023-02-13 ENCOUNTER — HOSPITAL ENCOUNTER (OUTPATIENT)
Dept: MAMMOGRAPHY | Facility: CLINIC | Age: 53
Discharge: HOME | End: 2023-02-13
Attending: INTERNAL MEDICINE | Admitting: INTERNAL MEDICINE
Payer: COMMERCIAL

## 2023-02-13 DIAGNOSIS — Z12.31 VISIT FOR SCREENING MAMMOGRAM: ICD-10-CM

## 2023-02-13 PROCEDURE — 77067 SCR MAMMO BI INCL CAD: CPT

## 2023-02-13 RX ORDER — TRETINOIN 0.5 MG/G
CREAM TOPICAL
Qty: 20 G | Refills: 0 | Status: SHIPPED | OUTPATIENT
Start: 2023-02-13

## 2023-03-23 ENCOUNTER — TELEPHONE (OUTPATIENT)
Dept: INTERNAL MEDICINE | Facility: CLINIC | Age: 53
End: 2023-03-23

## 2023-03-23 DIAGNOSIS — Z20.1 EXPOSURE TO TB: Primary | ICD-10-CM

## 2023-03-23 NOTE — TELEPHONE ENCOUNTER
Pt calling in states that she was exposed to a TB positive family member who is now in the hospital for TB. Would like PCP to review and advise. Pt is not having a cough or fever, notes that she was lantent TB positive and did undergo treatment in the past. Please review and advise.     Phi AMARAL RN

## 2023-03-24 NOTE — TELEPHONE ENCOUNTER
Due to exposure and asymptomatic, recommend IGRA testing. If negative, repeat testing can be considered in 8 weeks for confirmation. Order is in.Thank you.

## 2023-03-28 ENCOUNTER — E-VISIT (OUTPATIENT)
Dept: INTERNAL MEDICINE | Facility: CLINIC | Age: 53
End: 2023-03-28
Payer: COMMERCIAL

## 2023-03-28 ENCOUNTER — NURSE TRIAGE (OUTPATIENT)
Dept: NURSING | Facility: CLINIC | Age: 53
End: 2023-03-28
Payer: COMMERCIAL

## 2023-03-28 DIAGNOSIS — N39.0 URINARY TRACT INFECTION WITH HEMATURIA, SITE UNSPECIFIED: ICD-10-CM

## 2023-03-28 DIAGNOSIS — R39.9 UTI SYMPTOMS: Primary | ICD-10-CM

## 2023-03-28 DIAGNOSIS — R31.9 URINARY TRACT INFECTION WITH HEMATURIA, SITE UNSPECIFIED: ICD-10-CM

## 2023-03-28 PROCEDURE — 99421 OL DIG E/M SVC 5-10 MIN: CPT | Performed by: INTERNAL MEDICINE

## 2023-03-29 ENCOUNTER — LAB (OUTPATIENT)
Dept: LAB | Facility: CLINIC | Age: 53
End: 2023-03-29
Payer: COMMERCIAL

## 2023-03-29 DIAGNOSIS — R39.9 UTI SYMPTOMS: ICD-10-CM

## 2023-03-29 LAB
ALBUMIN UR-MCNC: 30 MG/DL
APPEARANCE UR: ABNORMAL
BACTERIA #/AREA URNS HPF: ABNORMAL /HPF
BILIRUB UR QL STRIP: NEGATIVE
COLOR UR AUTO: YELLOW
GLUCOSE UR STRIP-MCNC: NEGATIVE MG/DL
HGB UR QL STRIP: ABNORMAL
KETONES UR STRIP-MCNC: NEGATIVE MG/DL
LEUKOCYTE ESTERASE UR QL STRIP: ABNORMAL
NITRATE UR QL: POSITIVE
PH UR STRIP: 7 [PH] (ref 5–7)
RBC #/AREA URNS AUTO: ABNORMAL /HPF
SP GR UR STRIP: 1.02 (ref 1–1.03)
SQUAMOUS #/AREA URNS AUTO: ABNORMAL /LPF
UROBILINOGEN UR STRIP-ACNC: 0.2 E.U./DL
WBC #/AREA URNS AUTO: ABNORMAL /HPF

## 2023-03-29 PROCEDURE — 87186 SC STD MICRODIL/AGAR DIL: CPT

## 2023-03-29 PROCEDURE — 81001 URINALYSIS AUTO W/SCOPE: CPT

## 2023-03-29 PROCEDURE — 87086 URINE CULTURE/COLONY COUNT: CPT

## 2023-03-29 NOTE — TELEPHONE ENCOUNTER
Nurse Triage SBAR    Situation: Blood in urine    Background: Patient calling. Started around 6pm tonight.     Assessment: Blood in urine. Urine looks like coke the pop. Burning with urination. Temp: 98.6. Tiny clot.     Protocol Recommended Disposition: Emergency Department (Or PCP Triage)    Recommendation: According to the protocol, Patient should go to the ED now (Or PCP Triage). Advised Patient to wait for a call-back after nurse speaks with the on-call Provider. Care advice given. Patient verbalizes understanding and agrees with plan of care. Reviewed concerning symptoms and when to call back.     Paging on call Dr Vashti Salinas at 7:52pm - no response. Paging  repaged at 8:07pm. Per MD - E-visit to her provider. If her symptoms worsen overnight then she needs to go into the ED.     Provider Recommendation Follow Up:   Reached patient/caregiver. Informed of provider's recommendations. Patient verbalized understanding and agrees with the plan.     Xiao Burgess RN Nursing Advisor 3/28/2023 8:21 PM     Reason for Disposition    Patient sounds very sick or weak to the triager    Additional Information    Negative: Shock suspected (e.g., cold/pale/clammy skin, too weak to stand, low BP, rapid pulse)    Negative: Sounds like a life-threatening emergency to the triager    Negative: Urinary catheter, questions about    Negative: Recent back or abdominal injury    Negative: Recent genital injury    Negative: [1] Unable to urinate (or only a few drops) > 4 hours AND [2] bladder feels very full (e.g., palpable bladder or strong urge to urinate)    Negative: Passing pure blood or large blood clots (i.e., size > a dime) (Exception: stefan or small strands)    Negative: Fever > 100.4 F (38.0 C)    Protocols used: URINE - BLOOD IN-A-

## 2023-03-29 NOTE — PATIENT INSTRUCTIONS
Thank you for choosing us for your care. Given your symptoms, I would like you to do a lab-only visit to determine what is causing them.  I have placed the orders.  Please schedule an appointment with the lab right here in [place], or call [phone].  I will let you know when the results are back and next steps to take.

## 2023-03-30 RX ORDER — NITROFURANTOIN 25; 75 MG/1; MG/1
100 CAPSULE ORAL 2 TIMES DAILY
Qty: 14 CAPSULE | Refills: 0 | Status: SHIPPED | OUTPATIENT
Start: 2023-03-30 | End: 2023-05-04

## 2023-03-31 LAB — BACTERIA UR CULT: ABNORMAL

## 2023-05-04 ENCOUNTER — OFFICE VISIT (OUTPATIENT)
Dept: INTERNAL MEDICINE | Facility: CLINIC | Age: 53
End: 2023-05-04
Payer: COMMERCIAL

## 2023-05-04 VITALS
HEART RATE: 98 BPM | RESPIRATION RATE: 14 BRPM | TEMPERATURE: 97.9 F | DIASTOLIC BLOOD PRESSURE: 70 MMHG | SYSTOLIC BLOOD PRESSURE: 104 MMHG | OXYGEN SATURATION: 100 % | BODY MASS INDEX: 16.99 KG/M2 | WEIGHT: 92.3 LBS | HEIGHT: 62 IN

## 2023-05-04 DIAGNOSIS — B37.31 YEAST INFECTION OF THE VAGINA: ICD-10-CM

## 2023-05-04 DIAGNOSIS — Z22.7 LATENT TUBERCULOSIS: ICD-10-CM

## 2023-05-04 DIAGNOSIS — Z00.00 ROUTINE HISTORY AND PHYSICAL EXAMINATION OF ADULT: Primary | ICD-10-CM

## 2023-05-04 DIAGNOSIS — R74.8 ELEVATED LIVER ENZYMES: ICD-10-CM

## 2023-05-04 DIAGNOSIS — N89.8 VAGINAL DISCHARGE: ICD-10-CM

## 2023-05-04 LAB
ALBUMIN SERPL BCG-MCNC: 4.7 G/DL (ref 3.5–5.2)
ALP SERPL-CCNC: 45 U/L (ref 35–104)
ALT SERPL W P-5'-P-CCNC: 41 U/L (ref 10–35)
ANION GAP SERPL CALCULATED.3IONS-SCNC: 13 MMOL/L (ref 7–15)
AST SERPL W P-5'-P-CCNC: 38 U/L (ref 10–35)
BILIRUB SERPL-MCNC: 0.7 MG/DL
BUN SERPL-MCNC: 11.3 MG/DL (ref 6–20)
CALCIUM SERPL-MCNC: 9.9 MG/DL (ref 8.6–10)
CHLORIDE SERPL-SCNC: 102 MMOL/L (ref 98–107)
CHOLEST SERPL-MCNC: 226 MG/DL
CLUE CELLS: ABNORMAL
CREAT SERPL-MCNC: 0.81 MG/DL (ref 0.51–0.95)
DEPRECATED HCO3 PLAS-SCNC: 27 MMOL/L (ref 22–29)
ERYTHROCYTE [DISTWIDTH] IN BLOOD BY AUTOMATED COUNT: 11.5 % (ref 10–15)
GFR SERPL CREATININE-BSD FRML MDRD: 87 ML/MIN/1.73M2
GLUCOSE SERPL-MCNC: 89 MG/DL (ref 70–99)
HCT VFR BLD AUTO: 46.1 % (ref 35–47)
HDLC SERPL-MCNC: 69 MG/DL
HGB BLD-MCNC: 15.2 G/DL (ref 11.7–15.7)
LDLC SERPL CALC-MCNC: 131 MG/DL
MCH RBC QN AUTO: 30.1 PG (ref 26.5–33)
MCHC RBC AUTO-ENTMCNC: 33 G/DL (ref 31.5–36.5)
MCV RBC AUTO: 91 FL (ref 78–100)
NONHDLC SERPL-MCNC: 157 MG/DL
PLATELET # BLD AUTO: 298 10E3/UL (ref 150–450)
POTASSIUM SERPL-SCNC: 4.1 MMOL/L (ref 3.4–5.3)
PROT SERPL-MCNC: 8.1 G/DL (ref 6.4–8.3)
RBC # BLD AUTO: 5.05 10E6/UL (ref 3.8–5.2)
SODIUM SERPL-SCNC: 142 MMOL/L (ref 136–145)
TRICHOMONAS, WET PREP: ABNORMAL
TRIGL SERPL-MCNC: 128 MG/DL
TSH SERPL DL<=0.005 MIU/L-ACNC: 2.22 UIU/ML (ref 0.3–4.2)
WBC # BLD AUTO: 4.1 10E3/UL (ref 4–11)
WBC'S/HIGH POWER FIELD, WET PREP: ABNORMAL
YEAST, WET PREP: PRESENT

## 2023-05-04 PROCEDURE — 99396 PREV VISIT EST AGE 40-64: CPT | Performed by: INTERNAL MEDICINE

## 2023-05-04 PROCEDURE — 99213 OFFICE O/P EST LOW 20 MIN: CPT | Mod: 25 | Performed by: INTERNAL MEDICINE

## 2023-05-04 PROCEDURE — 80053 COMPREHEN METABOLIC PANEL: CPT | Performed by: INTERNAL MEDICINE

## 2023-05-04 PROCEDURE — 84443 ASSAY THYROID STIM HORMONE: CPT | Performed by: INTERNAL MEDICINE

## 2023-05-04 PROCEDURE — G0145 SCR C/V CYTO,THINLAYER,RESCR: HCPCS | Performed by: INTERNAL MEDICINE

## 2023-05-04 PROCEDURE — 86481 TB AG RESPONSE T-CELL SUSP: CPT | Performed by: INTERNAL MEDICINE

## 2023-05-04 PROCEDURE — 36415 COLL VENOUS BLD VENIPUNCTURE: CPT | Performed by: INTERNAL MEDICINE

## 2023-05-04 PROCEDURE — 87210 SMEAR WET MOUNT SALINE/INK: CPT | Performed by: INTERNAL MEDICINE

## 2023-05-04 PROCEDURE — 85027 COMPLETE CBC AUTOMATED: CPT | Performed by: INTERNAL MEDICINE

## 2023-05-04 PROCEDURE — 87624 HPV HI-RISK TYP POOLED RSLT: CPT | Performed by: INTERNAL MEDICINE

## 2023-05-04 PROCEDURE — 80061 LIPID PANEL: CPT | Performed by: INTERNAL MEDICINE

## 2023-05-04 RX ORDER — FLUCONAZOLE 150 MG/1
150 TABLET ORAL ONCE
Qty: 1 TABLET | Refills: 0 | Status: SHIPPED | OUTPATIENT
Start: 2023-05-04 | End: 2023-05-04

## 2023-05-04 NOTE — PROGRESS NOTES
SUBJECTIVE:   CC: Alida is an 52 year old who presents for preventive health visit.         Patient has been advised of split billing requirements and indicates understanding: Yes  Healthy Habits:     Getting at least 3 servings of Calcium per day:  Yes    Bi-annual eye exam:  Yes    Dental care twice a year:  Yes    Sleep apnea or symptoms of sleep apnea:  None    Diet:  Regular (no restrictions)    Frequency of exercise:  1 day/week    Duration of exercise:  15-30 minutes    Taking medications regularly:  0    Barriers to taking medications:  Not applicable    Medication side effects:  Not applicable    PHQ-2 Total Score: 0    Additional concerns today:  No  History of Present Illness       Reason for visit:  Physical    She eats 4 or more servings of fruits and vegetables daily.She consumes 0 sweetened beverage(s) daily.She exercises with enough effort to increase her heart rate 9 or less minutes per day.  She exercises with enough effort to increase her heart rate 3 or less days per week.   She is taking medications regularly.  She is not taking prescribed medications regularly due to Not applicable.       H/o of Latent TB; has seen infectious disease and was treated with INH for 9 months-         Today's PHQ-2 Score:       4/27/2023     4:29 PM   PHQ-2 ( 1999 Pf[employer])   Q1: Little interest or pleasure in doing things 0   Q2: Feeling down, depressed or hopeless 0   PHQ-2 Score 0   Q1: Little interest or pleasure in doing things Not at all   Q2: Feeling down, depressed or hopeless Not at all   PHQ-2 Score 0       Past Medical History:   Diagnosis Date     NO ACTIVE PROBLEMS        Past Surgical History:   Procedure Laterality Date     AS ENLARGE BREAST WITH IMPLANT  01/01/2014     BREAST SURGERY  march 2015    breast augementation     EXCISE GANGLION WRIST  01/01/2009    right hand     EYE SURGERY  2001    lasik     MOUTH SURGERY      4 wisdom teeth/plus 4 teeth       Current Outpatient Medications   Medication  Sig Dispense Refill     CALCIUM + D 600-200 MG-UNIT PO TABS 500 mg tablet once daily 200 Tab 3     tretinoin (RETIN-A) 0.05 % external cream APPLY TO AFFECTED AREA EVERY DAY AT BEDTIME 20 g 0     tretinoin 0.05 % EX external cream Apply topically At Bedtime         Family History   Problem Relation Age of Onset     Allergies Mother         and Asthma     Respiratory Mother      Asthma Mother      Thyroid Disease Mother      Lipids Father      Diabetes Father      Cancer Maternal Grandmother         cervical     Other Cancer Maternal Grandmother         cervical cancer     Cerebrovascular Disease Paternal Grandfather              Breast Cancer No family hx of      Ovarian Cancer No family hx of          Social History     Tobacco Use     Smoking status: Never     Smokeless tobacco: Never   Vaping Use     Vaping status: Never Used   Substance Use Topics     Alcohol use: No             3/10/2022     7:45 PM   Alcohol Use   Prescreen: >3 drinks/day or >7 drinks/week? Not Applicable     Reviewed orders with patient.  Reviewed health maintenance and updated orders accordingly - Yes  Lab work is in process    Breast Cancer Screening:        3/10/2022     7:46 PM   Breast CA Risk Assessment (FHS-7)   Do you have a family history of breast, colon, or ovarian cancer? No / Unknown            Pertinent mammograms are reviewed under the imaging tab.    History of abnormal Pap smear: NO - age 30-65 PAP every 5 years with negative HPV co-testing recommended      Latest Ref Rng & Units 2019     8:41 AM 2019     8:26 AM 2016    10:40 AM   PAP / HPV   PAP (Historical)  NIL       HPV 16 DNA NEG^Negative  Negative   Negative     HPV 18 DNA NEG^Negative  Negative   Negative     Other HR HPV NEG^Negative  Negative   Negative       Reviewed and updated as needed this visit by clinical staff                  Reviewed and updated as needed this visit by Provider                     Review of Systems  CONSTITUTIONAL:  "NEGATIVE for fever, chills, change in weight  INTEGUMENTARY/SKIN: NEGATIVE for worrisome rashes, moles or lesions  EYES: NEGATIVE for vision changes or irritation  ENT: NEGATIVE for ear, mouth and throat problems  RESP: NEGATIVE for significant cough or SOB  BREAST: NEGATIVE for masses, tenderness or discharge  CV: NEGATIVE for chest pain, palpitations or peripheral edema  GI: NEGATIVE for nausea, abdominal pain, heartburn, or change in bowel habits  : NEGATIVE for unusual urinary or vaginal symptoms. No vaginal bleeding.  MUSCULOSKELETAL: NEGATIVE for significant arthralgias or myalgia  NEURO: NEGATIVE for weakness, dizziness or paresthesias  PSYCHIATRIC: NEGATIVE for changes in mood or affect      OBJECTIVE:   /70   Pulse 98   Temp 97.9  F (36.6  C) (Tympanic)   Resp 14   Ht 1.575 m (5' 2\")   Wt 41.9 kg (92 lb 4.8 oz)   SpO2 100%   BMI 16.88 kg/m    Physical Exam  GENERAL APPEARANCE: healthy, alert and no distress  EYES: Eyes grossly normal to inspection, PERRL and conjunctivae and sclerae normal  HENT: ear canals and TM's normal, nose and mouth without ulcers or lesions, oropharynx clear and oral mucous membranes moist  RESP: lungs clear to auscultation - no rales, rhonchi or wheezes  BREAST: Bilateral breast implants noted without masses, tenderness or nipple discharge and no palpable axillary masses or adenopathy  CV: regular rate and rhythm, normal S1 S2,    ABDOMEN: soft, nontender, no hepatosplenomegaly, no masses and bowel sounds normal   (female): normal female external genitalia, normal urethral meatus, vaginal mucosal atrophy noted, normal cervix, adnexae, without masses,tenderness , white vaginal discharge noted, wet prep done, Pap obtained  MS: no musculoskeletal defects are noted and gait is age appropriate without ataxia  NEURO: Normal strength and tone, sensory exam grossly normal, mentation intact and speech normal  PSYCH: mentation appears normal and affect normal/bright   "   ASSESSMENT/PLAN:       (Z00.00) Routine history and physical examination of adult  (primary encounter diagnosis)  Plan: CBC with platelets, TSH with free T4 reflex,         Lipid panel reflex to direct LDL Fasting,         Comprehensive metabolic panel, Pap imaged thin         layer screen with HPV - recommended age 30 - 65        years (select HPV order below)            (Z22.7) Latent tuberculosis  Plan: Quantiferon TB Gold Plus            (N89.8) Vaginal discharge  Plan: Wet prep - Clinic Collect- Yeast present           (B37.31) Yeast infection of the vagina  Plan: started on fluconazole (DIFLUCAN) 150 MG tablet once as directed.explained clearly about the medication,insructions and side effects.               Patient has been advised of split billing requirements and indicates understanding: Yes      COUNSELING:  Reviewed preventive health counseling, as reflected in patient instructions       Regular exercise       Healthy diet/nutrition        She reports that she has never smoked. She has never used smokeless tobacco.          Deena Silvestre MD  [facility]

## 2023-05-04 NOTE — NURSING NOTE
"/70   Pulse 98   Temp 97.9  F (36.6  C) (Tympanic)   Resp 14   Ht 1.575 m (5' 2\")   Wt 41.9 kg (92 lb 4.8 oz)   SpO2 100%   BMI 16.88 kg/m      "

## 2023-05-05 ENCOUNTER — MYC MEDICAL ADVICE (OUTPATIENT)
Dept: INTERNAL MEDICINE | Facility: CLINIC | Age: 53
End: 2023-05-05
Payer: COMMERCIAL

## 2023-05-07 LAB
QUANTIFERON MITOGEN: 10 IU/ML
QUANTIFERON TB2 TUBE: 1.39

## 2023-05-08 LAB
GAMMA INTERFERON BACKGROUND BLD IA-ACNC: 1.21 IU/ML
M TB IFN-G BLD-IMP: NEGATIVE
M TB IFN-G CD4+ BCKGRND COR BLD-ACNC: 8.79 IU/ML
MITOGEN IGNF BCKGRD COR BLD-ACNC: 0.03 IU/ML
MITOGEN IGNF BCKGRD COR BLD-ACNC: 0.18 IU/ML
QUANTIFERON NIL TUBE: 1.21 IU/ML
QUANTIFERON TB1 TUBE: 1.24 IU/ML

## 2023-05-10 LAB
BKR LAB AP GYN ADEQUACY: NORMAL
BKR LAB AP GYN INTERPRETATION: NORMAL
BKR LAB AP HPV REFLEX: NORMAL
BKR LAB AP PREVIOUS ABNORMAL: NORMAL
PATH REPORT.COMMENTS IMP SPEC: NORMAL
PATH REPORT.COMMENTS IMP SPEC: NORMAL
PATH REPORT.RELEVANT HX SPEC: NORMAL

## 2023-05-12 LAB
HUMAN PAPILLOMA VIRUS 16 DNA: NEGATIVE
HUMAN PAPILLOMA VIRUS 18 DNA: NEGATIVE
HUMAN PAPILLOMA VIRUS FINAL DIAGNOSIS: NORMAL
HUMAN PAPILLOMA VIRUS OTHER HR: NEGATIVE

## 2023-06-08 ENCOUNTER — LAB (OUTPATIENT)
Dept: LAB | Facility: CLINIC | Age: 53
End: 2023-06-08
Payer: COMMERCIAL

## 2023-06-08 DIAGNOSIS — R74.8 ELEVATED LIVER ENZYMES: ICD-10-CM

## 2023-06-08 LAB
ALBUMIN SERPL BCG-MCNC: 4.2 G/DL (ref 3.5–5.2)
ALP SERPL-CCNC: 49 U/L (ref 35–104)
ALT SERPL W P-5'-P-CCNC: 42 U/L (ref 10–35)
AST SERPL W P-5'-P-CCNC: 35 U/L (ref 10–35)
BILIRUB DIRECT SERPL-MCNC: <0.2 MG/DL (ref 0–0.3)
BILIRUB SERPL-MCNC: 0.7 MG/DL
PROT SERPL-MCNC: 7.2 G/DL (ref 6.4–8.3)

## 2023-06-08 PROCEDURE — 80076 HEPATIC FUNCTION PANEL: CPT

## 2023-06-08 PROCEDURE — 36415 COLL VENOUS BLD VENIPUNCTURE: CPT

## 2023-06-14 ENCOUNTER — MYC MEDICAL ADVICE (OUTPATIENT)
Dept: INTERNAL MEDICINE | Facility: CLINIC | Age: 53
End: 2023-06-14

## 2023-06-14 DIAGNOSIS — R74.01 ELEVATED ALT MEASUREMENT: Primary | ICD-10-CM

## 2023-06-14 NOTE — TELEPHONE ENCOUNTER
Please see patient's mychart message below regarding elevated liver tests.    Please advise, thanks.

## 2023-06-29 ENCOUNTER — MYC MEDICAL ADVICE (OUTPATIENT)
Dept: INTERNAL MEDICINE | Facility: CLINIC | Age: 53
End: 2023-06-29

## 2023-06-29 ENCOUNTER — LAB (OUTPATIENT)
Dept: LAB | Facility: CLINIC | Age: 53
End: 2023-06-29
Payer: COMMERCIAL

## 2023-06-29 ENCOUNTER — ANCILLARY PROCEDURE (OUTPATIENT)
Dept: ULTRASOUND IMAGING | Facility: CLINIC | Age: 53
End: 2023-06-29
Attending: INTERNAL MEDICINE
Payer: COMMERCIAL

## 2023-06-29 DIAGNOSIS — R74.01 ELEVATED ALT MEASUREMENT: ICD-10-CM

## 2023-06-29 LAB — HOLD SPECIMEN: NORMAL

## 2023-06-29 PROCEDURE — 86803 HEPATITIS C AB TEST: CPT

## 2023-06-29 PROCEDURE — 76705 ECHO EXAM OF ABDOMEN: CPT

## 2023-06-29 PROCEDURE — 86708 HEPATITIS A ANTIBODY: CPT

## 2023-06-29 PROCEDURE — 86709 HEPATITIS A IGM ANTIBODY: CPT

## 2023-06-29 PROCEDURE — 86706 HEP B SURFACE ANTIBODY: CPT

## 2023-06-29 PROCEDURE — 86704 HEP B CORE ANTIBODY TOTAL: CPT

## 2023-06-29 PROCEDURE — 36415 COLL VENOUS BLD VENIPUNCTURE: CPT

## 2023-06-30 LAB
HAV IGG SER QL IA: REACTIVE
HAV IGM SERPL QL IA: NONREACTIVE
HBV CORE AB SERPL QL IA: NONREACTIVE
HBV SURFACE AB SERPL IA-ACNC: 0.04 M[IU]/ML
HBV SURFACE AB SERPL IA-ACNC: NONREACTIVE M[IU]/ML
HCV AB SERPL QL IA: NONREACTIVE

## 2024-01-15 ENCOUNTER — PATIENT OUTREACH (OUTPATIENT)
Dept: CARE COORDINATION | Facility: CLINIC | Age: 54
End: 2024-01-15
Payer: COMMERCIAL

## 2024-02-13 ENCOUNTER — MYC MEDICAL ADVICE (OUTPATIENT)
Dept: INTERNAL MEDICINE | Facility: CLINIC | Age: 54
End: 2024-02-13
Payer: COMMERCIAL

## 2024-02-14 ENCOUNTER — HOSPITAL ENCOUNTER (OUTPATIENT)
Dept: MAMMOGRAPHY | Facility: CLINIC | Age: 54
Discharge: HOME | End: 2024-02-14
Attending: INTERNAL MEDICINE | Admitting: INTERNAL MEDICINE
Payer: COMMERCIAL

## 2024-02-14 DIAGNOSIS — Z12.31 VISIT FOR SCREENING MAMMOGRAM: ICD-10-CM

## 2024-02-14 PROCEDURE — 77067 SCR MAMMO BI INCL CAD: CPT

## 2024-05-04 SDOH — HEALTH STABILITY: PHYSICAL HEALTH: ON AVERAGE, HOW MANY MINUTES DO YOU ENGAGE IN EXERCISE AT THIS LEVEL?: 60 MIN

## 2024-05-04 SDOH — HEALTH STABILITY: PHYSICAL HEALTH: ON AVERAGE, HOW MANY DAYS PER WEEK DO YOU ENGAGE IN MODERATE TO STRENUOUS EXERCISE (LIKE A BRISK WALK)?: 7 DAYS

## 2024-05-04 ASSESSMENT — SOCIAL DETERMINANTS OF HEALTH (SDOH): HOW OFTEN DO YOU GET TOGETHER WITH FRIENDS OR RELATIVES?: ONCE A WEEK

## 2024-05-06 ENCOUNTER — OFFICE VISIT (OUTPATIENT)
Dept: INTERNAL MEDICINE | Facility: CLINIC | Age: 54
End: 2024-05-06
Payer: COMMERCIAL

## 2024-05-06 VITALS
BODY MASS INDEX: 17.04 KG/M2 | HEIGHT: 62 IN | OXYGEN SATURATION: 100 % | DIASTOLIC BLOOD PRESSURE: 68 MMHG | TEMPERATURE: 97.4 F | HEART RATE: 64 BPM | RESPIRATION RATE: 17 BRPM | WEIGHT: 92.6 LBS | SYSTOLIC BLOOD PRESSURE: 102 MMHG

## 2024-05-06 DIAGNOSIS — Z22.7 LATENT TUBERCULOSIS: ICD-10-CM

## 2024-05-06 DIAGNOSIS — Z00.00 ROUTINE HISTORY AND PHYSICAL EXAMINATION OF ADULT: ICD-10-CM

## 2024-05-06 DIAGNOSIS — E78.5 HYPERLIPIDEMIA LDL GOAL <160: Primary | ICD-10-CM

## 2024-05-06 LAB — HGB BLD-MCNC: 13.5 G/DL (ref 11.7–15.7)

## 2024-05-06 PROCEDURE — 80061 LIPID PANEL: CPT | Performed by: INTERNAL MEDICINE

## 2024-05-06 PROCEDURE — 85018 HEMOGLOBIN: CPT | Performed by: INTERNAL MEDICINE

## 2024-05-06 PROCEDURE — 80053 COMPREHEN METABOLIC PANEL: CPT | Performed by: INTERNAL MEDICINE

## 2024-05-06 PROCEDURE — 99396 PREV VISIT EST AGE 40-64: CPT | Performed by: INTERNAL MEDICINE

## 2024-05-06 PROCEDURE — 36415 COLL VENOUS BLD VENIPUNCTURE: CPT | Performed by: INTERNAL MEDICINE

## 2024-05-06 NOTE — NURSING NOTE
"/68   Pulse 64   Temp 97.4  F (36.3  C) (Tympanic)   Resp 17   Ht 1.575 m (5' 2\")   Wt 42 kg (92 lb 9.6 oz)   SpO2 100%   BMI 16.94 kg/m      "

## 2024-05-06 NOTE — PROGRESS NOTES
Preventive Care Visit  [facility]  Deena Silvestre MD, Internal Medicine  May 6, 2024      Assessment & Plan     (Z00.00) Routine history and physical examination of adult  Plan: Comprehensive metabolic panel, Hemoglobin,         Lipid panel reflex to direct LDL Fasting,                 (E78.5) Hyperlipidemia LDL goal <160   Plan: Not on any medications at this time, on low-fat diet and regular exercise, check lipid panel reflex to direct LDL Fasting,                 (Z22.7) Latent tuberculosis  Plan: Patient has completed treatment with INH, patient is requesting TB blood test for her employment.  Quantiferon TB Gold Plus             Patient has been advised of split billing requirements and indicates understanding: Yes  Review of the result(s) of each unique test - hemoglobin, CMP      Counseling  Appropriate preventive services were discussed with this patient, including applicable screening as appropriate for nutrition, physical activity,      Subjective   Alida is a 53 year old, presenting for the following:  Physical        5/6/2024     1:04 PM   Additional Questions   Roomed by chris   Accompanied by SELF         5/6/2024     1:04 PM   Patient Reported Additional Medications   Patient reports taking the following new medications none        Health Care Directive  Patient does not have a Health Care Directive or Living Will: Discussed advance care planning with patient; information given to patient to review.    HPI     Hyperlipidemia Follow-Up    Are you regularly taking any medication or supplement to lower your cholesterol?   No  Are you having muscle aches or other side effects that you think could be caused by your cholesterol lowering medication?  NA          5/4/2024   General Health   How would you rate your overall physical health? Good   Feel stress (tense, anxious, or unable to sleep) Not at all         5/4/2024   Nutrition   Three or more servings of calcium each  day? Yes   Diet: Regular (no restrictions)   How many servings of fruit and vegetables per day? 4 or more   How many sweetened beverages each day? (!) 2         5/4/2024   Exercise   Days per week of moderate/strenous exercise 7 days   Average minutes spent exercising at this level 60 min         5/4/2024   Social Factors   Frequency of gathering with friends or relatives Once a week   Worry food won't last until get money to buy more No   Food not last or not have enough money for food? No   Do you have housing?  Yes   Are you worried about losing your housing? No   Lack of transportation? No   Unable to get utilities (heat,electricity)? No         5/6/2024   Fall Risk   Fallen 2 or more times in the past year? No   Trouble with walking or balance? No          5/4/2024   Dental   Dentist two times every year? Yes            Today's PHQ-2 Score:       5/6/2024    11:39 AM   PHQ-2 ( 1999 Pfizer)   Q1: Little interest or pleasure in doing things 0   Q2: Feeling down, depressed or hopeless 0   PHQ-2 Score 0   Q1: Little interest or pleasure in doing things Not at all   Q2: Feeling down, depressed or hopeless Not at all   PHQ-2 Score 0           5/4/2024   Substance Use   Alcohol more than 3/day or more than 7/wk No   Do you use any other substances recreationally? No     Social History     Tobacco Use    Smoking status: Never    Smokeless tobacco: Never   Vaping Use    Vaping status: Never Used   Substance Use Topics    Alcohol use: No    Drug use: No             5/4/2024   Breast Cancer Screening   Family history of breast, colon, or ovarian cancer? Yes         5/4/2024   LAST FHS-7 RESULTS   1st degree relative breast or ovarian cancer No   Any relative bilateral breast cancer No   Any male have breast cancer No   Any ONE woman have BOTH breast AND ovarian cancer No   Any woman with breast cancer before 50yrs No   2 or more relatives with breast AND/OR ovarian cancer No   2 or more relatives with breast AND/OR bowel  cancer No        Mammogram completed 2/24 5/4/2024   STI Screening   New sexual partner(s) since last STI/HIV test? No     History of abnormal Pap smear: NO - age 30-65 PAP every 5 years with negative HPV co-testing recommended        Latest Ref Rng & Units 5/4/2023    10:09 AM 1/2/2019     8:41 AM 1/2/2019     8:26 AM   PAP / HPV   PAP  Negative for Intraepithelial Lesion or Malignancy (NILM)      PAP (Historical)   NIL     HPV 16 DNA Negative Negative   Negative    HPV 18 DNA Negative Negative   Negative    Other HR HPV Negative Negative   Negative      ASCVD Risk   The ASCVD Risk score (Sher HOLLOWAY, et al., 2019) failed to calculate for the following reasons:    The systolic blood pressure is missing           Reviewed and updated as needed this visit by Provider                    Past Medical History:   Diagnosis Date    NO ACTIVE PROBLEMS      Past Surgical History:   Procedure Laterality Date    AS ENLARGE BREAST WITH IMPLANT  01/01/2014    BREAST SURGERY  march 2015    breast augementation    EXCISE GANGLION WRIST  01/01/2009    right hand    EYE SURGERY  2001    lasik    MOUTH SURGERY      4 wisdom teeth/plus 4 teeth       Current Outpatient Medications   Medication Sig Dispense Refill    CALCIUM + D 600-200 MG-UNIT PO TABS 500 mg tablet once daily 200 Tab 3    tretinoin (RETIN-A) 0.05 % external cream APPLY TO AFFECTED AREA EVERY DAY AT BEDTIME 20 g 0         Review of Systems  CONSTITUTIONAL: NEGATIVE for fever, chills,   EYES: NEGATIVE for vision changes or irritation  ENT/MOUTH: NEGATIVE for ear, mouth and throat problems  RESP: NEGATIVE for significant cough or SOB  BREAST: NEGATIVE for masses, tenderness or discharge  CV: NEGATIVE for chest pain, palpitations or peripheral edema  GI: NEGATIVE for nausea, abdominal pain, heartburn, or change in bowel habits  : NEGATIVE for frequency, dysuria, or hematuria  MUSCULOSKELETAL: NEGATIVE for significant arthralgias or myalgia  NEURO: NEGATIVE  "for weakness, dizziness or paresthesias  ENDOCRINE: NEGATIVE for temperature intolerance, skin/hair changes  HEME: NEGATIVE for bleeding problems  PSYCHIATRIC: NEGATIVE for changes in mood or affect     Objective    Exam  /68   Pulse 64   Temp 97.4  F (36.3  C) (Tympanic)   Resp 17   Ht 1.575 m (5' 2\")   Wt 42 kg (92 lb 9.6 oz)   SpO2 100%   BMI 16.94 kg/m     Estimated body mass index is 16.94 kg/m  as calculated from the following:    Height as of this encounter: 1.575 m (5' 2\").    Weight as of this encounter: 42 kg (92 lb 9.6 oz).    Physical Exam  GENERAL: alert and no distress  EYES: Eyes grossly normal to inspection, PERRL and conjunctivae and sclerae normal  HENT: ear canals and TM's normal, nose and mouth without ulcers or lesions  RESP: lungs clear to auscultation - no rales, rhonchi or wheezes  BREAST: Bilateral breast implants noted without masses, tenderness or nipple discharge and no palpable axillary masses or adenopathy  CV: regular rate and rhythm, normal S1 S2,    ABDOMEN: soft, nontender,   and bowel sounds normal  MS: no gross musculoskeletal defects noted, no edema  NEURO: Normal strength and tone, mentation intact and speech normal  PSYCH: mentation appears normal, affect normal/bright        Signed Electronically by: Deena Silvestre MD    "

## 2024-05-07 LAB
ALBUMIN SERPL BCG-MCNC: 4.5 G/DL (ref 3.5–5.2)
ALP SERPL-CCNC: 56 U/L (ref 40–150)
ALT SERPL W P-5'-P-CCNC: 17 U/L (ref 0–50)
ANION GAP SERPL CALCULATED.3IONS-SCNC: 8 MMOL/L (ref 7–15)
AST SERPL W P-5'-P-CCNC: 27 U/L (ref 0–45)
BILIRUB SERPL-MCNC: 0.4 MG/DL
BUN SERPL-MCNC: 14.8 MG/DL (ref 6–20)
CALCIUM SERPL-MCNC: 9.3 MG/DL (ref 8.6–10)
CHLORIDE SERPL-SCNC: 101 MMOL/L (ref 98–107)
CHOLEST SERPL-MCNC: 206 MG/DL
CREAT SERPL-MCNC: 0.79 MG/DL (ref 0.51–0.95)
DEPRECATED HCO3 PLAS-SCNC: 28 MMOL/L (ref 22–29)
EGFRCR SERPLBLD CKD-EPI 2021: 89 ML/MIN/1.73M2
FASTING STATUS PATIENT QL REPORTED: YES
GLUCOSE SERPL-MCNC: 89 MG/DL (ref 70–99)
HDLC SERPL-MCNC: 72 MG/DL
LDLC SERPL CALC-MCNC: 115 MG/DL
NONHDLC SERPL-MCNC: 134 MG/DL
POTASSIUM SERPL-SCNC: 3.8 MMOL/L (ref 3.4–5.3)
PROT SERPL-MCNC: 7.5 G/DL (ref 6.4–8.3)
SODIUM SERPL-SCNC: 137 MMOL/L (ref 135–145)
TRIGL SERPL-MCNC: 97 MG/DL

## 2024-06-13 ENCOUNTER — MYC MEDICAL ADVICE (OUTPATIENT)
Dept: INTERNAL MEDICINE | Facility: CLINIC | Age: 54
End: 2024-06-13
Payer: COMMERCIAL

## 2024-06-13 DIAGNOSIS — L70.0 ACNE VULGARIS: ICD-10-CM

## 2024-06-13 RX ORDER — TRETINOIN 0.5 MG/G
CREAM TOPICAL AT BEDTIME
Qty: 20 G | Refills: 0 | Status: CANCELLED | OUTPATIENT
Start: 2024-06-13

## 2024-06-13 NOTE — TELEPHONE ENCOUNTER
Med and pharmacy pending.    Please see patient's mychart message.     Please advise, thanks.    Mk Charles, Triage RN [facility]  2:44 PM 6/13/2024

## 2024-06-13 NOTE — TELEPHONE ENCOUNTER
This medication was never started or filled by me, this was started and refilled by Nydia Barrett PA-C.    Please see the note below from Nydia Barrett PA-C on 07/13/22.  Acne vulgaris  -     Pt requests script be filled by primary care due to cost affiliated with dermatology visit. Stable and works well for her. Renewed for her today.  -     tretinoin (RETIN-A) 0.05 % external cream; Apply topically At Bedtime

## 2024-06-14 NOTE — TELEPHONE ENCOUNTER
Is this something PCP would be willing prescribed? Should patient set up an appointment/ complete an evisit  to discuss?    Summer RN 9:09 AM June 14, 2024   [facility]

## 2025-02-17 ENCOUNTER — HOSPITAL ENCOUNTER (OUTPATIENT)
Dept: MAMMOGRAPHY | Facility: CLINIC | Age: 55
Discharge: HOME | End: 2025-02-17
Attending: INTERNAL MEDICINE | Admitting: INTERNAL MEDICINE
Payer: COMMERCIAL

## 2025-02-17 DIAGNOSIS — Z12.31 VISIT FOR SCREENING MAMMOGRAM: ICD-10-CM

## 2025-02-17 PROCEDURE — 77063 BREAST TOMOSYNTHESIS BI: CPT

## 2025-02-26 ENCOUNTER — OFFICE VISIT (OUTPATIENT)
Dept: INTERNAL MEDICINE | Facility: CLINIC | Age: 55
End: 2025-02-26
Payer: COMMERCIAL

## 2025-02-26 VITALS
RESPIRATION RATE: 11 BRPM | WEIGHT: 90.1 LBS | OXYGEN SATURATION: 99 % | SYSTOLIC BLOOD PRESSURE: 112 MMHG | TEMPERATURE: 98.2 F | HEART RATE: 97 BPM | DIASTOLIC BLOOD PRESSURE: 60 MMHG | BODY MASS INDEX: 16.58 KG/M2 | HEIGHT: 62 IN

## 2025-02-26 DIAGNOSIS — Z00.00 ROUTINE HISTORY AND PHYSICAL EXAMINATION OF ADULT: ICD-10-CM

## 2025-02-26 DIAGNOSIS — Z11.1 SCREENING EXAMINATION FOR PULMONARY TUBERCULOSIS: ICD-10-CM

## 2025-02-26 DIAGNOSIS — M25.522 BILATERAL ELBOW JOINT PAIN: ICD-10-CM

## 2025-02-26 DIAGNOSIS — M25.521 BILATERAL ELBOW JOINT PAIN: ICD-10-CM

## 2025-02-26 DIAGNOSIS — E78.2 MIXED HYPERLIPIDEMIA: Primary | ICD-10-CM

## 2025-02-26 DIAGNOSIS — E55.9 VITAMIN D DEFICIENCY: ICD-10-CM

## 2025-02-26 LAB
ALBUMIN SERPL BCG-MCNC: 4.3 G/DL (ref 3.5–5.2)
ALP SERPL-CCNC: 53 U/L (ref 40–150)
ALT SERPL W P-5'-P-CCNC: 18 U/L (ref 0–50)
ANION GAP SERPL CALCULATED.3IONS-SCNC: 11 MMOL/L (ref 7–15)
AST SERPL W P-5'-P-CCNC: 32 U/L (ref 0–45)
BILIRUB SERPL-MCNC: 0.4 MG/DL
BUN SERPL-MCNC: 19.1 MG/DL (ref 6–20)
CALCIUM SERPL-MCNC: 9.5 MG/DL (ref 8.8–10.4)
CHLORIDE SERPL-SCNC: 103 MMOL/L (ref 98–107)
CHOLEST SERPL-MCNC: 177 MG/DL
CK SERPL-CCNC: 188 U/L (ref 26–192)
CREAT SERPL-MCNC: 0.84 MG/DL (ref 0.51–0.95)
EGFRCR SERPLBLD CKD-EPI 2021: 82 ML/MIN/1.73M2
ERYTHROCYTE [DISTWIDTH] IN BLOOD BY AUTOMATED COUNT: 11.9 % (ref 10–15)
FASTING STATUS PATIENT QL REPORTED: YES
FASTING STATUS PATIENT QL REPORTED: YES
GLUCOSE SERPL-MCNC: 87 MG/DL (ref 70–99)
HCO3 SERPL-SCNC: 25 MMOL/L (ref 22–29)
HCT VFR BLD AUTO: 37.8 % (ref 35–47)
HDLC SERPL-MCNC: 72 MG/DL
HGB BLD-MCNC: 12.4 G/DL (ref 11.7–15.7)
LDLC SERPL CALC-MCNC: 94 MG/DL
MCH RBC QN AUTO: 29.9 PG (ref 26.5–33)
MCHC RBC AUTO-ENTMCNC: 32.8 G/DL (ref 31.5–36.5)
MCV RBC AUTO: 91 FL (ref 78–100)
NONHDLC SERPL-MCNC: 105 MG/DL
PLATELET # BLD AUTO: 261 10E3/UL (ref 150–450)
POTASSIUM SERPL-SCNC: 4.1 MMOL/L (ref 3.4–5.3)
PROT SERPL-MCNC: 7.4 G/DL (ref 6.4–8.3)
RBC # BLD AUTO: 4.15 10E6/UL (ref 3.8–5.2)
SODIUM SERPL-SCNC: 139 MMOL/L (ref 135–145)
TRIGL SERPL-MCNC: 54 MG/DL
VIT D+METAB SERPL-MCNC: 31 NG/ML (ref 20–50)
WBC # BLD AUTO: 6.1 10E3/UL (ref 4–11)

## 2025-02-26 SDOH — HEALTH STABILITY: PHYSICAL HEALTH: ON AVERAGE, HOW MANY MINUTES DO YOU ENGAGE IN EXERCISE AT THIS LEVEL?: 60 MIN

## 2025-02-26 SDOH — HEALTH STABILITY: PHYSICAL HEALTH: ON AVERAGE, HOW MANY DAYS PER WEEK DO YOU ENGAGE IN MODERATE TO STRENUOUS EXERCISE (LIKE A BRISK WALK)?: 6 DAYS

## 2025-02-26 ASSESSMENT — SOCIAL DETERMINANTS OF HEALTH (SDOH): HOW OFTEN DO YOU GET TOGETHER WITH FRIENDS OR RELATIVES?: MORE THAN THREE TIMES A WEEK

## 2025-02-26 NOTE — PROGRESS NOTES
Preventive Care Visit  [facility]  Deena Silvestre MD, Internal Medicine  Feb 26, 2025      Assessment & Plan     (Z00.00) Routine history and physical examination of adult  Plan: Comprehensive metabolic panel, CBC with         platelets,  CK total,            (E78.2) Mixed hyperlipidemia     Plan: Not on any medications at this time, on healthy diet and regular exercise, check lipid panel reflex to direct LDL Fasting            (E55.9) Vitamin D deficiency  Plan: Vitamin D Deficiency            (M25.521,  M25.522) Bilateral elbow joint pain  Plan: Bilateral medial epicondylitis, patient has seen orthopedics in the past and has not been advised to wear elbow brace.  Patient has stopped lifting weights since 3 months, advised to follow-up with her orthopedics for further evaluation and management like cortisone injection/physical therapy      (Z11.1) Screening examination for pulmonary tuberculosis  Plan: Quantiferon TB Gold Plus,      Patient has been advised of split billing requirements and indicates understanding: Yes        Counseling  Appropriate preventive services were addressed with this patient via screening, questionnaire, or discussion as appropriate     Subjective   Alida is a 54 year old, presenting for the following:  Physical        2/26/2025    12:41 PM   Additional Questions   Roomed by chris   Accompanied by self         2/26/2025    12:41 PM   Patient Reported Additional Medications   Patient reports taking the following new medications none          HPI     Hyperlipidemia Follow-Up    Are you regularly taking any medication or supplement to lower your cholesterol?   No  Are you having muscle aches or other side effects that you think could be caused by your cholesterol lowering medication?  NA    Patient complains of bilateral elbow joint pain since lifting weights, patient has stopped lifting weights since 3 months, has seen orthopedics in the past and was  advised to wear elbow brace.  Patient states pain radiates to the forearm and sometimes upper arm.    Patient is also requesting TB QuantiFERON test for work      Health Care Directive  Patient does not have a Health Care Directive: Discussed advance care planning with patient; information given to patient to review.      2/26/2025   General Health   How would you rate your overall physical health? Excellent   Feel stress (tense, anxious, or unable to sleep) Only a little   (!) STRESS CONCERN      2/26/2025   Nutrition   Three or more servings of calcium each day? Yes   Diet: Regular (no restrictions)   How many servings of fruit and vegetables per day? (!) 2-3   How many sweetened beverages each day? 0-1         2/26/2025   Exercise   Days per week of moderate/strenous exercise 6 days   Average minutes spent exercising at this level 60 min         2/26/2025   Social Factors   Frequency of gathering with friends or relatives More than three times a week   Worry food won't last until get money to buy more No   Food not last or not have enough money for food? No   Do you have housing? (Housing is defined as stable permanent housing and does not include staying ouside in a car, in a tent, in an abandoned building, in an overnight shelter, or couch-surfing.) Yes   Are you worried about losing your housing? No   Lack of transportation? No   Unable to get utilities (heat,electricity)? No   Want help with housing or utility concern? No         2/26/2025   Fall Risk   Fallen 2 or more times in the past year? No    No   Trouble with walking or balance? No    No       Multiple values from one day are sorted in reverse-chronological order          2/26/2025   Dental   Dentist two times every year? Yes            Today's PHQ-2 Score:       2/26/2025    12:42 PM   PHQ-2 ( 1999 Pfizer)   Q1: Little interest or pleasure in doing things 0   Q2: Feeling down, depressed or hopeless 0   PHQ-2 Score 0           2/26/2025   Substance  Use   Alcohol more than 3/day or more than 7/wk No   Do you use any other substances recreationally? No     Social History     Tobacco Use    Smoking status: Never    Smokeless tobacco: Never   Vaping Use    Vaping status: Never Used   Substance Use Topics    Alcohol use: No    Drug use: No           2/17/2025   LAST FHS-7 RESULTS   1st degree relative breast or ovarian cancer No   Any relative bilateral breast cancer No   Any male have breast cancer No   Any ONE woman have BOTH breast AND ovarian cancer No   Any woman with breast cancer before 50yrs No   2 or more relatives with breast AND/OR ovarian cancer No   2 or more relatives with breast AND/OR bowel cancer No        Mammogram 02/25 2/26/2025   STI Screening   New sexual partner(s) since last STI/HIV test? No     History of abnormal Pap smear: No - age 30- 64 PAP with HPV every 5 years recommended        Latest Ref Rng & Units 5/4/2023    10:09 AM 1/2/2019     8:41 AM 1/2/2019     8:26 AM   PAP / HPV   PAP  Negative for Intraepithelial Lesion or Malignancy (NILM)      PAP (Historical)   NIL     HPV 16 DNA Negative Negative   Negative    HPV 18 DNA Negative Negative   Negative    Other HR HPV Negative Negative   Negative      ASCVD Risk   The 10-year ASCVD risk score (Sher HOLLOWAY, et al., 2019) is: 0.9%    Values used to calculate the score:      Age: 54 years      Sex: Female      Is Non- : No      Diabetic: No      Tobacco smoker: No      Systolic Blood Pressure: 102 mmHg      Is BP treated: No      HDL Cholesterol: 72 mg/dL      Total Cholesterol: 206 mg/dL           Reviewed and updated as needed this visit by Provider                    Past Medical History:   Diagnosis Date    NO ACTIVE PROBLEMS      Past Surgical History:   Procedure Laterality Date    AS ENLARGE BREAST WITH IMPLANT  01/01/2014    BREAST SURGERY  march 2015    breast augementation    EXCISE GANGLION WRIST  01/01/2009    right hand    EYE SURGERY   "2001    lasik    MOUTH SURGERY      4 wisdom teeth/plus 4 teeth     Current Outpatient Medications   Medication Sig Dispense Refill    CALCIUM + D 600-200 MG-UNIT PO TABS 500 mg tablet once daily 200 Tab 3    Nutritional Supplements (VITAL PEPTIDE 1.5 DELMY PO) Take by mouth.      tretinoin (RETIN-A) 0.05 % external cream APPLY TO AFFECTED AREA EVERY DAY AT BEDTIME 20 g 0         Review of Systems  CONSTITUTIONAL: NEGATIVE for fever, chills,    EYES: NEGATIVE for vision changes or irritation  ENT/MOUTH: NEGATIVE for ear, mouth and throat problems  RESP: NEGATIVE for significant cough or SOB  BREAST: NEGATIVE for masses, tenderness or discharge  CV: NEGATIVE for chest pain, palpitations or peripheral edema  GI: NEGATIVE for nausea, abdominal pain, heartburn, or change in bowel habits  : NEGATIVE for frequency, dysuria, or hematuria  MUSCULOSKELETAL: Bilateral elbow pain  NEURO: NEGATIVE for weakness, dizziness or paresthesias  ENDOCRINE: NEGATIVE for temperature intolerance, skin/hair changes  HEME: NEGATIVE for bleeding problems  PSYCHIATRIC: NEGATIVE for changes in mood or affect       Objective    Exam  /60   Pulse 97   Temp 98.2  F (36.8  C) (Tympanic)   Resp 11   Ht 1.562 m (5' 1.5\")   Wt 40.9 kg (90 lb 1.6 oz)   SpO2 99%   BMI 16.75 kg/m     Estimated body mass index is 16.75 kg/m  as calculated from the following:    Height as of this encounter: 1.562 m (5' 1.5\").    Weight as of this encounter: 40.9 kg (90 lb 1.6 oz).    Physical Exam  GENERAL: alert and no distress  EYES: Eyes grossly normal to inspection, PERRL and conjunctivae and sclerae normal  HENT: ear canals and TM's normal, nose and mouth without ulcers or lesions  RESP: lungs clear to auscultation - no rales, rhonchi or wheezes  BREAST: Patient declined breast exam ( had mammogram on 2/17/2025)  CV: regular rate and rhythm, normal S1 S2,   ABDOMEN: soft, nontender,   and bowel sounds normal  MS: Tenderness on bilateral medial " epicondyles and also tenderness on forearm muscles.  No lower extremity edema or calf tenderness bilaterally  NEURO: Normal strength and tone, mentation intact and speech normal  PSYCH: mentation appears normal, affect normal/bright        Signed Electronically by: Deena Silvestre MD

## 2025-02-26 NOTE — NURSING NOTE
"/60   Pulse 97   Temp 98.2  F (36.8  C) (Tympanic)   Resp 11   Ht 1.562 m (5' 1.5\")   Wt 40.9 kg (90 lb 1.6 oz)   SpO2 99%   BMI 16.75 kg/m      "